# Patient Record
Sex: MALE | Race: WHITE | Employment: OTHER | URBAN - METROPOLITAN AREA
[De-identification: names, ages, dates, MRNs, and addresses within clinical notes are randomized per-mention and may not be internally consistent; named-entity substitution may affect disease eponyms.]

---

## 2017-02-20 ENCOUNTER — HOSPITAL ENCOUNTER (OUTPATIENT)
Dept: ENDOSCOPY | Age: 72
Discharge: HOME | End: 2017-02-20
Attending: INTERNAL MEDICINE | Admitting: INTERNAL MEDICINE

## 2017-02-20 VITALS
WEIGHT: 175 LBS | RESPIRATION RATE: 18 BRPM | DIASTOLIC BLOOD PRESSURE: 93 MMHG | BODY MASS INDEX: 25.92 KG/M2 | OXYGEN SATURATION: 97 % | HEART RATE: 70 BPM | SYSTOLIC BLOOD PRESSURE: 165 MMHG | HEIGHT: 69 IN | TEMPERATURE: 97.8 F

## 2017-02-20 RX ORDER — SODIUM CHLORIDE 9 MG/ML
INJECTION, SOLUTION INTRAVENOUS CONTINUOUS
Status: DISCONTINUED | OUTPATIENT
Start: 2017-02-20 | End: 2017-02-21 | Stop reason: HOSPADM

## 2017-02-20 RX ORDER — ELECTROLYTES/DEXTROSE
1 SOLUTION, ORAL ORAL DAILY
COMMUNITY

## 2017-02-20 RX ORDER — METOPROLOL TARTRATE 50 MG/1
50 TABLET, FILM COATED ORAL DAILY
Status: ON HOLD | COMMUNITY
End: 2019-04-10

## 2017-02-20 RX ADMIN — SODIUM CHLORIDE: 9 INJECTION, SOLUTION INTRAVENOUS at 08:34

## 2017-02-20 ASSESSMENT — PAIN SCALES - GENERAL
PAINLEVEL_OUTOF10: 0

## 2017-02-20 ASSESSMENT — PAIN - FUNCTIONAL ASSESSMENT: PAIN_FUNCTIONAL_ASSESSMENT: 0-10

## 2019-04-10 ENCOUNTER — HOSPITAL ENCOUNTER (INPATIENT)
Age: 74
LOS: 6 days | Discharge: HOME HEALTH | DRG: 091 | End: 2019-04-16
Attending: PHYSICAL MEDICINE & REHABILITATION | Admitting: PHYSICAL MEDICINE & REHABILITATION
Payer: MEDICARE

## 2019-04-10 PROBLEM — Z96.612 STATUS POST TOTAL SHOULDER ARTHROPLASTY, LEFT: Status: ACTIVE | Noted: 2019-04-10

## 2019-04-10 PROCEDURE — 1280000000 HC REHAB R&B

## 2019-04-10 PROCEDURE — 6360000002 HC RX W HCPCS: Performed by: PHYSICAL MEDICINE & REHABILITATION

## 2019-04-10 PROCEDURE — 94640 AIRWAY INHALATION TREATMENT: CPT

## 2019-04-10 PROCEDURE — 6370000000 HC RX 637 (ALT 250 FOR IP): Performed by: PHYSICAL MEDICINE & REHABILITATION

## 2019-04-10 PROCEDURE — 94760 N-INVAS EAR/PLS OXIMETRY 1: CPT

## 2019-04-10 RX ORDER — ACETAMINOPHEN 500 MG
1000 TABLET ORAL EVERY 6 HOURS PRN
COMMUNITY

## 2019-04-10 RX ORDER — ONDANSETRON 4 MG/1
4 TABLET, FILM COATED ORAL EVERY 8 HOURS PRN
Status: DISCONTINUED | OUTPATIENT
Start: 2019-04-10 | End: 2019-04-16 | Stop reason: HOSPADM

## 2019-04-10 RX ORDER — ALLOPURINOL 300 MG/1
300 TABLET ORAL DAILY
Status: DISCONTINUED | OUTPATIENT
Start: 2019-04-10 | End: 2019-04-10

## 2019-04-10 RX ORDER — ALBUTEROL SULFATE 90 UG/1
2 AEROSOL, METERED RESPIRATORY (INHALATION) EVERY 6 HOURS PRN
COMMUNITY

## 2019-04-10 RX ORDER — ALLOPURINOL 100 MG/1
200 TABLET ORAL DAILY
Status: DISCONTINUED | OUTPATIENT
Start: 2019-04-11 | End: 2019-04-16 | Stop reason: HOSPADM

## 2019-04-10 RX ORDER — HEPARIN SODIUM 5000 [USP'U]/ML
5000 INJECTION, SOLUTION INTRAVENOUS; SUBCUTANEOUS EVERY 8 HOURS SCHEDULED
Status: DISCONTINUED | OUTPATIENT
Start: 2019-04-10 | End: 2019-04-16 | Stop reason: HOSPADM

## 2019-04-10 RX ORDER — VANCOMYCIN HYDROCHLORIDE 125 MG/1
125 CAPSULE ORAL EVERY 6 HOURS SCHEDULED
Status: DISCONTINUED | OUTPATIENT
Start: 2019-04-10 | End: 2019-04-16 | Stop reason: HOSPADM

## 2019-04-10 RX ORDER — ACETAMINOPHEN 325 MG/1
650 TABLET ORAL EVERY 4 HOURS PRN
Status: DISCONTINUED | OUTPATIENT
Start: 2019-04-10 | End: 2019-04-16 | Stop reason: HOSPADM

## 2019-04-10 RX ORDER — THIAMINE MONONITRATE (VIT B1) 100 MG
100 TABLET ORAL DAILY
Status: DISCONTINUED | OUTPATIENT
Start: 2019-04-11 | End: 2019-04-16 | Stop reason: HOSPADM

## 2019-04-10 RX ORDER — FOLIC ACID 1 MG/1
1 TABLET ORAL DAILY
COMMUNITY
End: 2020-12-18

## 2019-04-10 RX ORDER — TRAZODONE HYDROCHLORIDE 50 MG/1
25 TABLET ORAL NIGHTLY
Status: ON HOLD | COMMUNITY
End: 2019-04-16 | Stop reason: SDUPTHER

## 2019-04-10 RX ORDER — ALBUTEROL SULFATE 2.5 MG/3ML
2.5 SOLUTION RESPIRATORY (INHALATION) EVERY 6 HOURS PRN
Status: DISCONTINUED | OUTPATIENT
Start: 2019-04-10 | End: 2019-04-16 | Stop reason: HOSPADM

## 2019-04-10 RX ORDER — SENNA AND DOCUSATE SODIUM 50; 8.6 MG/1; MG/1
2 TABLET, FILM COATED ORAL 2 TIMES DAILY
Status: DISCONTINUED | OUTPATIENT
Start: 2019-04-10 | End: 2019-04-12

## 2019-04-10 RX ORDER — FOLIC ACID 1 MG/1
1 TABLET ORAL DAILY
Status: DISCONTINUED | OUTPATIENT
Start: 2019-04-11 | End: 2019-04-16 | Stop reason: HOSPADM

## 2019-04-10 RX ORDER — VERAPAMIL HYDROCHLORIDE 240 MG/1
240 TABLET, FILM COATED, EXTENDED RELEASE ORAL DAILY
Status: DISCONTINUED | OUTPATIENT
Start: 2019-04-11 | End: 2019-04-16 | Stop reason: HOSPADM

## 2019-04-10 RX ORDER — ASPIRIN 81 MG/1
81 TABLET ORAL DAILY
Status: DISCONTINUED | OUTPATIENT
Start: 2019-04-11 | End: 2019-04-16 | Stop reason: HOSPADM

## 2019-04-10 RX ORDER — MULTIVITAMIN WITH FOLIC ACID 400 MCG
1 TABLET ORAL DAILY
Status: DISCONTINUED | OUTPATIENT
Start: 2019-04-11 | End: 2019-04-16 | Stop reason: HOSPADM

## 2019-04-10 RX ORDER — TRAZODONE HYDROCHLORIDE 50 MG/1
25 TABLET ORAL NIGHTLY
Status: DISCONTINUED | OUTPATIENT
Start: 2019-04-10 | End: 2019-04-16 | Stop reason: HOSPADM

## 2019-04-10 RX ORDER — THIAMINE MONONITRATE (VIT B1) 100 MG
100 TABLET ORAL DAILY
COMMUNITY

## 2019-04-10 RX ADMIN — TRAZODONE HYDROCHLORIDE 25 MG: 50 TABLET ORAL at 22:21

## 2019-04-10 RX ADMIN — Medication 2 PUFF: at 21:09

## 2019-04-10 RX ADMIN — HEPARIN SODIUM 5000 UNITS: 5000 INJECTION INTRAVENOUS; SUBCUTANEOUS at 22:21

## 2019-04-10 RX ADMIN — VANCOMYCIN HYDROCHLORIDE 125 MG: 125 CAPSULE ORAL at 22:21

## 2019-04-10 ASSESSMENT — PAIN SCALES - GENERAL: PAINLEVEL_OUTOF10: 0

## 2019-04-10 NOTE — PROGRESS NOTES
[address] RECORD NUMBER:  [id number]  AGE: 72 y.o.    GENDER: male  : 1945  TODAYS DATE:  4/10/2019      Room Orientation     Patient admitted to room 3275 per [] Wheel Chair  [] Bed  [] Recliner  [x] Stretcher  [] Other:      Transferred to:  [] Bed  [x] Recliner  [] Other  Patient Required minimum assistance with None    Patient was oriented to:  [x] Call Light  [x] Room Phone  [x] TV  [x] Thermostat  [x] Bathroom  [x] Bed and Chair Alarms per Rehab Policy  [x] Closet  [x] F[employer] Soup and it's Use on Rehab  [] Other:    Patient Verbalized Understanding: Yes  Family Present: No      Rehab Orientation     [x] 3 Hours of Therapy  through   [x] Focus and Specialty of Physical, Occupational, and Speech and Language Pathology  [x] Weekly Team Conference and Discharge Planning  [x] Roles of the Rehab Doctor, Consulting Doctors, Nursing, Dietary, and Social Work  [x] Everyday Clothing, including proper footwear, for Therapy  [x] Visiting Hours, Visitor Ages, and Visitors Sleeping Overnight in the Hospital  [x] Visitor Participation in Therapy  [x]  and Sundays on Rehab  [x] Introduction of Welcome Folder, including Rehab Expectations, Nurse Manager's Welcome Letter, Visitor's Guide, and Menu Service  [x] Planning Ahead and Ordering Meals  [x] Falls Contract [x] Signed, [x] Copied, and [x] Taped to [employer]  [x] Other: Patient Rights    Patient Verbalized Understanding: Yes  Family Present: No    Electronically signed by Jennifer Chaudhari RN on 4/10/2019 at 7:29 PM

## 2019-04-10 NOTE — PROGRESS NOTES
[facility]  Dr. Vale Art Progress Note  4/10/2019  11:58 AM    Patient Name:   Sara Gold  YOB: 1945    Diagnosis: Left shoulder joint prosthetic infection        Subjective: Patient is a 72-year-old male with a history of left shoulder arthroplasty in Dec. 2016 that developed an infection post op, and he had incision and drainage and IV antibiotics for 6 weeks, and on oral Cipro for 6 months. He did well until March 2018 when he developed left shoulder pain and swelling again, and was placed on oral antibiotic therapy with a repeat incision and drainage of a left shoulder abscess. He did well with oral antibiotics again until January, 2019 when he developed pain and swelling again while taking po Cipro. In March, he had removal of his left total shoulder arthroplasty prosthesis. He was placed on IV antibiotics, and he is now taking vancomycin and cefepime. He also developed acute kidney injury and was placed on hemodialysis. He was started in therapies, but needs more therapy before discharged to home safely with his family. He is now admitted to our rehabilitation work on goals of improving his transfers, gait, balance, and self-care activities with no weightbearing status of the left arm. He continues on IV antibiotics and hemodialysis. We'll get nephrology consult. There were no vitals taken for this visit. Last 24 hour lab  No results found for this or any previous visit (from the past 24 hour(s)). Plan: Will plan to admit to our Rehab Unit today.       Dr. Vale Art

## 2019-04-11 LAB
ANION GAP SERPL CALCULATED.3IONS-SCNC: 13 MMOL/L (ref 3–16)
BUN BLDV-MCNC: 51 MG/DL (ref 7–20)
CALCIUM SERPL-MCNC: 8.9 MG/DL (ref 8.3–10.6)
CHLORIDE BLD-SCNC: 99 MMOL/L (ref 99–110)
CO2: 25 MMOL/L (ref 21–32)
CREAT SERPL-MCNC: 6.3 MG/DL (ref 0.8–1.3)
GFR AFRICAN AMERICAN: 11
GFR NON-AFRICAN AMERICAN: 9
GLUCOSE BLD-MCNC: 97 MG/DL (ref 70–99)
HBV SURFACE AB TITR SER: <3.5 MIU/ML
HCT VFR BLD CALC: 25.7 % (ref 40.5–52.5)
HEMOGLOBIN: 8.3 G/DL (ref 13.5–17.5)
HEPATITIS B SURFACE ANTIGEN INTERPRETATION: NORMAL
MAGNESIUM: 1.8 MG/DL (ref 1.8–2.4)
MCH RBC QN AUTO: 32.4 PG (ref 26–34)
MCHC RBC AUTO-ENTMCNC: 32.4 G/DL (ref 31–36)
MCV RBC AUTO: 100.1 FL (ref 80–100)
PDW BLD-RTO: 16.3 % (ref 12.4–15.4)
PLATELET # BLD: 167 K/UL (ref 135–450)
PMV BLD AUTO: 9.4 FL (ref 5–10.5)
POTASSIUM REFLEX MAGNESIUM: 4.3 MMOL/L (ref 3.5–5.1)
RBC # BLD: 2.57 M/UL (ref 4.2–5.9)
SODIUM BLD-SCNC: 137 MMOL/L (ref 136–145)
WBC # BLD: 5.9 K/UL (ref 4–11)

## 2019-04-11 PROCEDURE — 1280000000 HC REHAB R&B

## 2019-04-11 PROCEDURE — 97535 SELF CARE MNGMENT TRAINING: CPT

## 2019-04-11 PROCEDURE — 90937 HEMODIALYSIS REPEATED EVAL: CPT

## 2019-04-11 PROCEDURE — 94640 AIRWAY INHALATION TREATMENT: CPT

## 2019-04-11 PROCEDURE — 97110 THERAPEUTIC EXERCISES: CPT | Performed by: PHYSICAL THERAPIST

## 2019-04-11 PROCEDURE — 2580000003 HC RX 258: Performed by: PHYSICAL MEDICINE & REHABILITATION

## 2019-04-11 PROCEDURE — 36415 COLL VENOUS BLD VENIPUNCTURE: CPT

## 2019-04-11 PROCEDURE — 97167 OT EVAL HIGH COMPLEX 60 MIN: CPT

## 2019-04-11 PROCEDURE — 6370000000 HC RX 637 (ALT 250 FOR IP): Performed by: PHYSICAL MEDICINE & REHABILITATION

## 2019-04-11 PROCEDURE — 83735 ASSAY OF MAGNESIUM: CPT

## 2019-04-11 PROCEDURE — 85027 COMPLETE CBC AUTOMATED: CPT

## 2019-04-11 PROCEDURE — 97530 THERAPEUTIC ACTIVITIES: CPT

## 2019-04-11 PROCEDURE — 80048 BASIC METABOLIC PNL TOTAL CA: CPT

## 2019-04-11 PROCEDURE — 6360000002 HC RX W HCPCS: Performed by: PHYSICAL MEDICINE & REHABILITATION

## 2019-04-11 PROCEDURE — 97530 THERAPEUTIC ACTIVITIES: CPT | Performed by: PHYSICAL THERAPIST

## 2019-04-11 PROCEDURE — 97162 PT EVAL MOD COMPLEX 30 MIN: CPT | Performed by: PHYSICAL THERAPIST

## 2019-04-11 PROCEDURE — 97116 GAIT TRAINING THERAPY: CPT | Performed by: PHYSICAL THERAPIST

## 2019-04-11 PROCEDURE — 86704 HEP B CORE ANTIBODY TOTAL: CPT

## 2019-04-11 PROCEDURE — 87340 HEPATITIS B SURFACE AG IA: CPT

## 2019-04-11 PROCEDURE — 5A1D70Z PERFORMANCE OF URINARY FILTRATION, INTERMITTENT, LESS THAN 6 HOURS PER DAY: ICD-10-PCS | Performed by: INTERNAL MEDICINE

## 2019-04-11 PROCEDURE — 86706 HEP B SURFACE ANTIBODY: CPT

## 2019-04-11 PROCEDURE — 94760 N-INVAS EAR/PLS OXIMETRY 1: CPT

## 2019-04-11 RX ADMIN — VANCOMYCIN HYDROCHLORIDE 125 MG: 125 CAPSULE ORAL at 13:13

## 2019-04-11 RX ADMIN — Medication 100 MG: at 08:00

## 2019-04-11 RX ADMIN — VANCOMYCIN HYDROCHLORIDE 125 MG: 125 CAPSULE ORAL at 05:18

## 2019-04-11 RX ADMIN — ALLOPURINOL 200 MG: 100 TABLET ORAL at 08:00

## 2019-04-11 RX ADMIN — CEFEPIME 2 G: 2 INJECTION, POWDER, FOR SOLUTION INTRAVENOUS at 21:52

## 2019-04-11 RX ADMIN — ASPIRIN 81 MG: 81 TABLET ORAL at 08:00

## 2019-04-11 RX ADMIN — HEPARIN SODIUM 5000 UNITS: 5000 INJECTION INTRAVENOUS; SUBCUTANEOUS at 21:51

## 2019-04-11 RX ADMIN — HEPARIN SODIUM 5000 UNITS: 5000 INJECTION INTRAVENOUS; SUBCUTANEOUS at 13:13

## 2019-04-11 RX ADMIN — MOMETASONE FUROATE AND FORMOTEROL FUMARATE DIHYDRATE 1 PUFF: 100; 5 AEROSOL RESPIRATORY (INHALATION) at 20:49

## 2019-04-11 RX ADMIN — HEPARIN SODIUM 5000 UNITS: 5000 INJECTION INTRAVENOUS; SUBCUTANEOUS at 05:18

## 2019-04-11 RX ADMIN — VERAPAMIL HYDROCHLORIDE 240 MG: 240 TABLET, FILM COATED, EXTENDED RELEASE ORAL at 08:00

## 2019-04-11 RX ADMIN — THERA TABS 1 TABLET: TAB at 08:00

## 2019-04-11 RX ADMIN — MOMETASONE FUROATE AND FORMOTEROL FUMARATE DIHYDRATE 1 PUFF: 100; 5 AEROSOL RESPIRATORY (INHALATION) at 10:23

## 2019-04-11 RX ADMIN — FOLIC ACID 1 MG: 1 TABLET ORAL at 08:00

## 2019-04-11 RX ADMIN — VANCOMYCIN HYDROCHLORIDE 125 MG: 125 CAPSULE ORAL at 00:30

## 2019-04-11 RX ADMIN — TRAZODONE HYDROCHLORIDE 25 MG: 50 TABLET ORAL at 21:51

## 2019-04-11 ASSESSMENT — PAIN SCALES - GENERAL
PAINLEVEL_OUTOF10: 0

## 2019-04-11 NOTE — PROGRESS NOTES
Physical Therapy    Facility/Department: [address] REHAB  Initial Assessment and Daily Treatment Note  - AM and PM Sessions        NAME: Harry Yoo  : 1945  MRN: [id number]    Date of Service: 2019    Discharge Recommendations:  Continue to assess pending progress, Home with assist PRN, Patient would benefit from continued therapy after discharge   PT Equipment Recommendations  Other: currently using no assistive device    Assessment   Body structures, Functions, Activity limitations: Decreased functional mobility ; Decreased strength;Decreased endurance  Assessment: Patient presents to inpatient rehab with diagnosis of metabolic encephalopathy with infection of left total shoulder replacement that now has a spacer in place. Patient is wearing a sling for support and protection of left shoulder joint and is aware of continued need to move elbow and wrist.  Patient has impaired sensation in bilateral forefeet and impaired vision/depth perception which limit safety with functional ambulation. However, patient was able to ambulate 76' with no device and CGA on level surfaces, curb and steps with 1 rail, with minimal to moderate shortness of breath. Patient would benefit from additional therapies on inpatient rehab to maximize safety, strength, endurance, and independence with funcitional mobility prior to retuning home with his wife who has dementia and intermittent assist from their children. Treatment Diagnosis: impaired functional mobility, unsteady gait  Prognosis: Good  Decision Making: Medium Complexity  History: Per Dr. Deepti Bai - Patient is a 21-year-old male with a history of left shoulder arthroplasty in Dec. 2016 that developed an infection post op, and he had incision and drainage and IV antibiotics for 6 weeks, and on oral Cipro for 6 months.  He did well until 2018 when he developed left shoulder pain and swelling again, and was placed on oral antibiotic therapy with a repeat transition to MWF - will be determined. TDC right chest, PICC line right upper arm,  sling for left UE when up and moving per patient report  Vision/Hearing  Vision: Impaired  Vision Exceptions: Wears glasses at all times(reports depth perception problems due to history of torn retina)  Hearing: Exceptions to [facility]  Hearing Exceptions: Hard of hearing/hearing concerns;Bilateral hearing aid     Subjective  General  Chart Reviewed: Yes  Additional Pertinent Hx: Per Dr. Zuri Souza - Patient is a 29-year-old male with a history of left shoulder arthroplasty in Dec. 2016 that developed an infection post op, and he had incision and drainage and IV antibiotics for 6 weeks, and on oral Cipro for 6 months. He did well until March 2018 when he developed left shoulder pain and swelling again, and was placed on oral antibiotic therapy with a repeat incision and drainage of a left shoulder abscess. He did well with oral antibiotics again until January, 2019 when he developed pain and swelling again while taking po Cipro. In March, he had removal of his left total shoulder arthroplasty prosthesis. He was placed on IV antibiotics, and he is now taking vancomycin and cefepime. He also developed acute kidney injury and was placed on hemodialysis. He was started in therapies, but needs more therapy before discharged to home safely with his family. He is now admitted to our rehabilitation work on goals of improving his transfers, gait, balance, and self-care activities with no weightbearing status of the left arm. He continues on IV antibiotics and hemodialysis. We'll get nephrology consult. Referring Practitioner: Sharad Gold MD  Referral Date : 04/10/19  Diagnosis: metabolic encephalopathy with Left shoulder joint prosthetic infection  Subjective  Subjective: Patient reports having dialysis later this afternoon - is aware that he has a therapy session and nurse informed him that he would go to dialysis after that appointment.   Denies pain.           Orientation  Orientation  Overall Orientation Status: Within Functional Limits  Social/Functional History  Social/Functional History  Lives With: Spouse(wife has Alheimer's, daughters assist with her care)  Type of Home: House(town house,)  Home Layout: Two level(first floor and basement, patient sleeps in basement due to sleep apnea 6 +7 steps down with railing on both sides)  Home Access: Stairs to enter with rails  Entrance Stairs - Number of Steps: 1 step into front without rails, 2 steps into garage without rails  Entrance Stairs - Rails: None  Bathroom Shower/Tub: Walk-in shower(upstairs - bathroom pt uses for shower)  Bathroom Toilet: Standard(comfort height)  Bathroom Equipment: Shower chair  Receives Help From: Family, Neighbor  ADL Assistance: Independent  Homemaking Assistance: Independent  Homemaking Responsibilities: Yes  Ambulation Assistance: Independent  Transfer Assistance: Independent  Active : Yes(not since March after removal of shoulder prosthetic)  Mode of Transportation: Car  Occupation: Retired  Type of occupation:  and taught school  Leisure & Hobbies: active with [employer].   Used to play a lot of golf  IADL Comments: wife does laundry and dishes as it gives her something to do, but needs some supervision  Additional Comments: wife is currently staying by her self at night while patient in the hospital.  They are both widowers, so have 2 families to assist with her care  Cognition    NT    Objective     Observation/Palpation  Observation: sling on left arm - reports needs to way sling when up and moving    AROM RLE (degrees)  RLE AROM: WFL  RLE General AROM: shoulder slightly limited but functional  AROM LLE (degrees)  LLE General AROM: shoulder NT - in sling, spacer after total shoulder replacement infection; elbow and wrist WFL  AROM RUE (degrees)  RUE AROM : WFL  AROM LUE (degrees)  LUE AROM : WFL  Strength RLE  Strength RLE: WFL  Comment: grossly 4/5  Strength LLE  Strength LLE: WFL  Comment: grossly 4/5  Strength RUE  Strength RUE: WFL  Comment: shoulder 3+/5, elbow 4/5  Strength LUE  Comment: shoulder NT, elbow and wrist at least 3/5  Tone RLE  RLE Tone: Normotonic  Tone LLE  LLE Tone: Normotonic  Motor Control  Gross Motor?: WFL  Sensation  Overall Sensation Status: Impaired  Light Touch: Partial deficits in the RLE;Partial deficits in the LLE(diminished to absent sensation in bilateral forefeet, reports beginning with numbness in tips of fingers)  Bed mobility  Bridging: Stand by assistance  Rolling to Left: Stand by assistance  Rolling to Right: Stand by assistance  Supine to Sit: Stand by assistance  Sit to Supine: Stand by assistance  Scooting: Stand by assistance  Comment: on flat mat with 2 pillows to patient's R while seated  Transfers  Sit to Stand: Contact guard assistance  Stand to sit: Contact guard assistance  Car Transfer: Contact guard assistance  Comment: cues for hand placement with car transfer, otherwise using no device and naturally reaches for arm rest of chair with R UE, L UE in sling  Ambulation  Ambulation?: Yes  WB Status: sling when up and moving  Ambulation 1  Surface: level tile  Device: No Device  Assistance: Contact guard assistance  Quality of Gait: quick pace, occasional cues to slow down and attend to R side as patient reports poor vision and depth perception, fairly steady  Distance: 75', shorter distances with multiple turns to access car and recliner in his room at the end of the session  Stairs/Curb  Stairs?: Yes  Stairs  # Steps : 4  Rails: Right ascending  Curbs: 6\"  Device: Hand Held Assist(for curb step for steadying assist)  Other Apparatus: (L UE sling in place throughout session)  Assistance: Contact guard assistance  Comment: turned sidways to descend 4 steps with CGA, cue for safely placing entire foot on steps with ascendings steps; preferred to have hand held assist for ascending/descending curb step especially with impaired balance due to L UE in sling     Balance  Sitting - Static: Good  Sitting - Dynamic: Good  Standing - Static: Good;-  Standing - Dynamic: Good;-  Comments: able to  pen from standing position using a reacher with CGA - unable to safely bend over to retrieve pen from floor in a standing position and owns a reacher, gait with no device and CGA      Second Session  Patient reports that he will have dialysis later today and this Saturday, but will transition to Monday, Wednesday, Friday dialysis starting next week. No complaints and agreeable to therapy. Sit to stand with CGA/SBA. Ambulated 125' with no device and CGA/SBA, slightly unsteady with L arm in sling, but no significant loss of balance, mild shortness of breath. Standing exercises in parallel bars for R UE support, as L UE is in sling: heel/toe raises, mini squats, marching, hip abd, hip ext, and knee flex x 15 reps each LE - seated rest breaks between every 2 exercises due to moderate shortness of breath and general fatigue. NuStep x 8 minutes - resistance 4, R arm only 8, seat 8 with average steps per minute ~66. Assisted patient back to his room via ambulation with no device - ambulated 100' with CGA. Patient made comfortable in his room with all needs met. Sit to supine with SBA. Alli Parker RN, from dialysis present in patient to begin dialysis. Plan   Plan  Times per week: 5-6  Times per day: Twice a day  Plan weeks: 1  Current Treatment Recommendations: Strengthening, Balance Training, Functional Mobility Training, Transfer Training, Endurance Training, Gait Training, Stair training, Home Exercise Program, Safety Education & Training, Patient/Caregiver Education & Training, Positioning  Safety Devices  Type of devices:  All fall risk precautions in place, Gait belt, Left in chair, Call light within reach, Chair alarm in place    Goals  Short term goals  Time Frame for Short term goals: Patient will in 1 week:

## 2019-04-11 NOTE — PLAN OF CARE
Problem: Falls - Risk of:  Goal: Will remain free from falls  Description  Will remain free from falls  Outcome: Ongoing  Note:   Fall protocol in place. Bed and chair alarms in place. Non skid footwear on at all times. Room clean and clutter free. Call light with in reach. Education on proper call light use and to call before ambulating. Problem: IP BOWEL ELIMINATION  Goal: LTG - patient will utilize adaptive techniques/equipment to complete bowel elimination  Outcome: Ongoing     Problem: SKIN INTEGRITY  Goal: LTG - Patient will be free from infection  Outcome: Ongoing  Note:   Skin is clean and dry and intact. No new skin issues noted at this time. Skin assessment completed every shift. Toileting every 2 hrs and as needed. Problem: IP MOBILITY  Goal: LTG - patient will be able to go up and down a curb/step with the appropriate device  Outcome: Ongoing     Problem: Pain:  Goal: Pain level will decrease  Description  Pain level will decrease  Outcome: Ongoing  Note:   Patient has no complaints of pain at this time. PRN pain medication to be administered per MD orders. Pain assessed on a 0-10 scale. Repositioning for comfort.

## 2019-04-11 NOTE — PROGRESS NOTES
Patient admitted for Left Shoulder Arthroplast  on  April,  10, 2019 . Patient alert and oriented x 4 . VSS. Patient's last bowel movement noted on April , 10 , 2019. Patient transfers x 1 with walker. Patient aware of room and surroundings. Morning assessment complete. Patient educated on use of call light. Medication administered this morning with no complications. Patient current pain level status: denies, Interventions made as necessary: none needed  . Shannon Fall Score: 80. Robert Scale: 20.  Bed/Chair alarm on  and call light within reach. Input and output being monitored along with daily weights. Patient has no further needs at this time. RN will continue to monitor.

## 2019-04-11 NOTE — PLAN OF CARE
Chidi Frias RN  Outcome: Completed  4/10/2019 2330 by Reyna eKlly RN  Outcome: Ongoing  Note:   Patient has no complaints of pain at this time. PRN pain medication to be administered per MD orders. Pain assessed on a 0-10 scale. Repositioning for comfort. Goal: Control of acute pain  Description  Control of acute pain   4/11/2019 1005 by Chidi Frias RN  Outcome: Ongoing  4/11/2019 1004 by Chidi Frias RN  Reactivated  4/11/2019 0958 by Chidi Frias RN  Outcome: Completed  Goal: Control of chronic pain  Description  Control of chronic pain   4/11/2019 1005 by Chidi Frias RN  Outcome: Ongoing  4/11/2019 1004 by Chidi Frias RN  Reactivated  4/11/2019 0958 by Chidi Frias RN  Outcome: Completed     Problem: Mobility - Impaired:  Goal: Mobility will improve  4/11/2019 1005 by Chidi Frias RN  Outcome: Ongoing  4/11/2019 1004 by Chidi Frias RN  Reactivated  4/11/2019 0958 by Chidi Frias RN  Outcome: Completed     Problem: Infection - Surgical Site:  Goal: Will show no infection signs and symptoms  4/11/2019 1005 by Chidi Frias RN  Outcome: Ongoing  4/11/2019 1004 by Chidi Frias RN  Reactivated  4/11/2019 0958 by Chidi Frias RN  Outcome: Completed     Problem: Pain - Acute:  Goal: Pain level will decrease  Description  Pain level will decrease   4/11/2019 1005 by Chidi Frias RN  Outcome: Ongoing  4/11/2019 1004 by Chidi Frias RN  Reactivated  4/11/2019 0958 by Chidi Frias RN  Outcome: Completed  4/10/2019 2330 by Reyna Kelly RN  Outcome: Ongoing  Note:   Patient has no complaints of pain at this time. PRN pain medication to be administered per MD orders. Pain assessed on a 0-10 scale. Repositioning for comfort.        Problem: IP BREATHING  Goal: STG - respiratory rate and effort will be within normal limits for the patient  4/11/2019 1005 by Chidi Frias RN  Outcome: Ongoing  4/11/2019 1004 by Chidi Frias RN  Reactivated  4/11/2019 0958 by Chidi Frias, RN  Outcome: Completed  Goal: STG - Patient performs or directs assisted coughing  4/11/2019 1005 by Maren Serrano RN  Outcome: Ongoing  4/11/2019 1005 by Maren Serrano RN  Reactivated  4/11/2019 0958 by Maren Serrano RN  Outcome: Completed     Problem: NUTRITION  Goal: Patient maintains weight  4/11/2019 1005 by Maren Serrano RN  Outcome: Ongoing  4/11/2019 1002 by Maren Serrano RN  Reactivated  4/11/2019 0958 by Maren Serrano RN  Outcome: Completed     Problem: SAFETY  Goal: STG - patient locks brakes on wheelchair  4/11/2019 1005 by Maren Serrano RN  Outcome: Ongoing  4/11/2019 1002 by Maren Serrano RN  Reactivated  4/11/2019 0958 by Maren Serrano RN  Outcome: Completed  Goal: STG - Patient uses call light consistently to request assistance with transfers  4/11/2019 1005 by Maren Serrano RN  Outcome: Ongoing  4/11/2019 1002 by Maren Serrano RN  Reactivated  4/11/2019 0958 by Maren Serrano RN  Outcome: Completed   Bed in lowest position, wheels locked, bed/chair exit alarm in place, call light within reach, and non skid footwear on. Walkway free of clutter. Pt alert and oriented and able to make needs known. Pt educated to use call light when needing to get up, and pt utilizes call light to make needs known. Will continue to monitor.      Problem: PAIN  Goal: LTG - Patient will manage pain with the appropriate technique/Intervention  4/11/2019 1005 by Maren Serrano RN  Outcome: Ongoing  4/11/2019 1002 by Maren Serrano RN  Reactivated  4/11/2019 0958 by Maren Serrano RN  Outcome: Completed  Goal: LTG - Patient will demonstrate intervention for managing pain  4/11/2019 1005 by Maren Serrano RN  Outcome: Ongoing  4/11/2019 1002 by Maren Serrano RN  Reactivated  4/11/2019 0958 by Maren Serrano RN  Outcome: Completed  Goal: STG - Patient will reduce or eliminate use of analgesics  4/11/2019 1005 by Maren Serrano RN  Outcome: Ongoing  4/11/2019 1002 by Maren Serrano RN  Reactivated  4/11/2019 0958 by Maggie Weber RN  Outcome: Completed  Goal: STG - pain is manageable through therapies  4/11/2019 1005 by Maggie Weber RN  Outcome: Ongoing  4/11/2019 1002 by Maggie Weber RN  Reactivated  4/11/2019 0958 by Maggie Weber RN  Outcome: Completed  Goal: STG - Patient will verbalize an acceptable level of pain  4/11/2019 1005 by Maggie Weber RN  Outcome: Ongoing  4/11/2019 1002 by Maggie Weber RN  Reactivated  4/11/2019 0958 by Maggie Weber RN  Outcome: Completed  Goal: STG - patients pain is managed to allow active participation in daily activities  4/11/2019 1005 by Maggie Weber RN  Outcome: Ongoing  4/11/2019 1002 by Maggie Weber RN  Reactivated  4/11/2019 0958 by Maggie Weber RN  Outcome: Completed  Goal: STG - Patient will increase activity level  4/11/2019 1005 by Maggie Weber RN  Outcome: Ongoing  4/11/2019 1002 by Maggie Weber RN  Reactivated  4/11/2019 0958 by Maggie Weber RN  Outcome: Completed  Goal: STG - patient verbalizes a reduction in pain level  4/11/2019 1005 by Maggie Weber RN  Outcome: Ongoing  4/11/2019 1002 by Maggie Weber RN  Reactivated  4/11/2019 0958 by Maggie Weber RN  Outcome: Completed   Pt assessed for pain. Pt denies any pain at this time. Will continue to monitor pt and assess for pain throughout rest of shift.

## 2019-04-11 NOTE — H&P
not have any smokeless tobacco history on file. He reports that he drinks alcohol. He reports that he does not use drugs. family history includes Heart Disease in his father. REVIEW OF SYSTEMS:   CONSTITUTIONAL: negative for fevers, chills, diaphoresis, activity change, appetite change, fatigue, night sweats and unexpected weight change. EYES: negative for blurred vision, eye discharge, visual disturbance and icterus. HEENT: negative for hearing loss, tinnitus, ear drainage, sinus pressure, nasal congestion, epistaxis and snoring. RESPIRATORY: Negative for hemoptysis, cough, sputum production. + Asthma  CARDIOVASCULAR: negative for chest pain, palpitations, exertional chest pressure/discomfort, edema, syncope. GASTROINTESTINAL: negative for nausea, vomiting, diarrhea, constipation, blood in stool and abdominal pain. GENITOURINARY: negative for frequency, dysuria, urinary incontinence, decreased urine volume, and hematuria. HEMATOLOGIC/LYMPHATIC: negative for easy bruising, bleeding and lymphadenopathy. ALLERGIC/IMMUNOLOGIC: negative for recurrent infections, angioedema, anaphylaxis and drug reactions. ENDOCRINE: negative for weight changes and diabetic symptoms including polyuria, polydipsia and polyphagia. MUSCULOSKELETAL: negative for pain, joint swelling, decreased range of motion and muscle weakness. + Gout  NEUROLOGICAL: negative for headaches, slurred speech, unilateral weakness. PSYCHIATRIC/BEHAVIORAL: negative for hallucinations, behavioral problems, confusion and agitation. All pertinent positives are noted in the HPI.     Physical Examination:  Vitals:   Patient Vitals for the past 24 hrs:   BP Temp Temp src Pulse Resp SpO2 Weight   04/11/19 0406 (!) 157/65 98.4 °F (36.9 °C) Oral 114 17 92 % 204 lb 9.4 oz (92.8 kg)   04/10/19 2110 -- -- -- -- 18 95 % --     Psych: Stable mood, normal judgement, normal affect   Const: No distress  Eyes: Conjunctiva noninjected, no icterus noted; therapies including but not limited to  physical, occupational, respiratory, and speech, nutritional services, wound care,   and prosthetics and orthotics. Given the patients complex condition  and risk of further medical complications, rehabilitation services cannot be  safely provided at a lower level of care such as a skilled nursing facility. I have compared the patients medical and functional status at the time of the  preadmission screening and the same on this date, and there are no significant changes. By signing this document, I acknowledge that I have personally performed a  full physical examination on this patient within 24 hours of admission to  this inpatient rehabilitation facility and have determined the patient to be  able to tolerate the above course of treatment at an intensive level for a  reasonable period of time. I will be completing a detailed individualized  Plan of Care for this patient by day four of the patients stay based upon the  Preadmission Screen, this Post-Admission Evaluation, and the therapy  evaluations. 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 Follow bowel movements. Enema or suppository if needed. Bladder: Check PVR x 3. [address] if PVR > 200ml or if any volume is > 500 ml. Pain: Tylenol is ordered prn.        Barbara Casarez MD, 4/11/2019, 7:13 AM

## 2019-04-11 NOTE — PLAN OF CARE
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 Follow bowel movements. Enema or suppository if needed.      Bladder: Check PVR x 3. [address] if PVR > 200ml or if any volume is > 500 ml.      Pain: Tylenol is ordered prn.              ADMIT DATE:4/10/2019    Patient Goals:   Goal is to return home. Admitting Impairments: Decreased functional mobility ; Decreased endurance;Decreased high-level IADLs;Decreased ADL status; Decreased balance  Barriers: primary caregiver for wife with Alzheimers, limited use of left arm, decreased endurance, decreased strength, medical co-morbidities   Participation: prior to admission patient was independent, lived with wife who has Alzheimers, and was active in his Sh[employer].   Goal is to return home at prior level of funciton and be able to assist wife as before     CARE PLAN     NURSING:  Jonna Failing while on this unit will:     [x] Be continent of bowel and bladder     [x] Have an adequate number of bowel movements  [] Urinate with no urinary retention >300ml in bladder  [] Complete bladder protocol with gutierrez removal  [x] Maintain O2 SATs at 92%  [x] Have pain managed while on ARU       [] Be pain free by discharge   [x] Have no skin breakdown while on ARU  [] Have improved skin integrity via wound measurements  [x] Have no signs/symptoms of infection at the wound site  [x] Be free from injury during hospitalization   [x] Complete education with patient/family with understanding demonstrated for:  [x] Adjustment   [x] Other: no weight bearing to left arm, PICC line care, Vascath care, dialysis needs, C. Diff education. Nursing interventions may include bowel/bladder training, education for medical assistive devices, medication education, O2 saturation management, energy conservation, stress management techniques, fall prevention, alarms protocol, seating and positioning, skin/wound care, pressure relief instruction,dressing changes,  infection protection, DVT prophylaxis, and/or assistance with in room safety with transfers to bed, toilet, wheelchair, shower as well as bathroom activities and hygiene. Patient/caregiver education for:   [x] Disease/sustained injury/management      [x] Medication Use   [x] Surgical intervention   [x] Safety   [x] Body mechanics and or joint protection   [x] Health maintenance         PHYSICAL THERAPY:  Goals:                  Short term goals  Time Frame for Short term goals: Patient will in 1 week:   Short term goal 1: bed mobility independently  Short term goal 2: transfers independently  Short term goal 3: ambulate 200' with no device indpendently on level surfaces  Short term goal 4: ascend/descend curb step with no device or rail and close SBA/CGA for steadying  Short term goal 5: ascend/descend 12 steps with 1 rails and supervision            Long term goals  Time Frame for Long term goals : STGs=LTGs  These goals were reviewed with this patient at the time of assessment and Isabel Martines is in agreement. Plan of Care: Pt to be seen 90  mins per day for 5-6 day/week 1 week.                    Current Treatment Recommendations: Strengthening, Balance Training, Functional Mobility Training, Transfer Training, Endurance Training, Gait Training, Stair training, Home Exercise Program, Safety Education & Training, Patient/Caregiver Education & Training, Positioning      OCCUPATIONAL THERAPY:  Goals:             Short term goals  Time Frame for Short term goals: 1 week pt will. Short term goal 1: bathe indep, set up to cover PICC and port  Short term goal 2: dress indep  Short term goal 3: toilet indep with grab bar - AD as needed  Short term goal 4: transfer indep without device, but UE support as needed  Short term goal 5: functional mobility indep, use of sling as AD :  Long term goals  Time Frame for Long term goals : same as stg : These goals were reviewed with this patient at the time of assessment and Isabel Martines is in agreement    Plan of Care:  Pt to be seen 90   mins per day for 5-6 day/week 1 week. Patient Education: role of OT, goals      SPEECH THERAPY: Goals will be left blank if speech is not following this patient. Goals:                                                          CASE MANAGEMENT:  Goals:   Assist patient/family with discharge planning, patient/family counseling,   and coordination with insurance during ARU stay. Admission Period/Goal FIM SCORES  FIM Admit/Goal Score   Eating/Swallowing 7/7   Grooming 5/7   Bathing 4/5   Upper Body Dressing 5/7   Lower Body Dressing 4/7   Toileting 4/6   Bladder Charli, Accidents = FIM 6/6   Bowel  Charli, Accidents = FIM 6/6   Bed/Chair Transfer 4/6   Toilet Transfer 4/6   Tub/Shower Transfer  4/6   Locomotion:  Ambulation (Walk) / Wheelchair:  W = walk , w/c = wheelchair  Distance:   1= 0-49 ft , 2=  ft, 3= 150+ ft Distance: 2   Level of Assist: 2   Mode: w   FIM: 2/6      Stairs 2/5   Comprehension 7/7   Expression 6/7   Social Interaction 7/7   Problem Solving 5/6   Memory 6/6        Isabel Martines will be seen a minimum of 3 hours of therapy per day, a minimum of 5 out of 7 days per week. [] In this rare instance due to the nature of this patient's medical involvement, this patient will be seen 15 hours per week (900 minutes within a 7 day period).     Treatments may include therapeutic exercises, gait training, neuromuscular re-ed, transfer training, community reintegration, bed mobility, self care, home mgmt, cognitive training, energy conservation, and patient/family education. In addition, dietician/nutritionist may monitor calorie count as well as intake and collaboratively work with SLP on dietary upgrades. Neuropsychology/Psychology may evaluate and provide necessary support. Medical issues being managed closely and that require 24 hour availability of a physician:   [] Swallowing Precautions  [] Bowel/Bladder Fx  [x] Weight bearing precautions   [x] Wound Care    [x] Pain Mgmt   [x] Infection Protection   [x] DVT Prophylaxis   [x] Fall Precautions  [x] Fluid/Electrolyte/Nutrition Balance   [] Voice Protection   [] Respiratory  [] Other:    Medical Prognosis: [] Good  [x] Fair    [] Guarded   Total expected IRF days 7  Anticipated discharge destination:    [] Home Independently   [x] Home Modified Independent  [] Home with supervision    []SNF     [] Other                                           Physician anticipated functional outcomes:  Improve gait, transfers, self care to modified independent to allow safe return home with wife    IPOC brief synthesis: Patient is a 77-year-old male with a history of left shoulder arthroplasty in Dec. 2016 that developed an infection post op, and he had incision and drainage and IV antibiotics for 6 weeks, and on oral Cipro for 6 months. He did well until March 2018 when he developed left shoulder pain and swelling again, and was placed on oral antibiotic therapy with a repeat incision and drainage of a left shoulder abscess. He did well with oral antibiotics again until January, 2019 when he developed pain and swelling again while taking po Cipro. In March, he had removal of his left total shoulder arthroplasty prosthesis. He was placed on IV antibiotics, and he is now taking vancomycin and cefepime. He also developed acute kidney injury and was placed on hemodialysis.  He was started in therapies, but needs more therapy before discharged to home safely with his family. He is now admitted to our rehabilitation work on goals of improving his transfers, gait, balance, and self-care activities with no weightbearing status of the left arm. He continues on IV antibiotics and hemodialysis. We'll get nephrology consult.             I have reviewed this initial plan of care and agree with its contents:    Title   Name    Date    Time    Physician: Dr. Madyson Keys, 4/11/19, 1 pm    Case Mgmt:  Jannette Holt, [place]     Case Management   [phone]    4/12/2019  12:59 PM      OT: Jaswinder Sofia, OTR/L  #[id number] 4/11/19 12:16 PM      PT: Electronically signed by Hugo Caballero, PT #[id number] on 4/11/2019 at 2:51 PM    RN: Ross Foley RN, CRRN 04/13/2019 11:39 am    ST:    : Deanna Reyes, ASCENCION  4/12/2019  2455    Other:

## 2019-04-11 NOTE — PROGRESS NOTES
antibiotics, and he is now taking vancomycin and cefepime. He also developed acute kidney injury and was placed on hemodialysis. He was started in therapies, but needs more therapy before discharged to home safely with his family. He is now admitted to our rehabilitation work on goals of improving his transfers, gait, balance, and self-care activities with no weightbearing status of the left arm. He continues on IV antibiotics and hemodialysis. We'll get nephrology consult. (copied per note Dr Celia Slade 4/10/19)  Exam: bathing, dressing, grooming, toileting, transfers, functional mobility, UE function, cognition  Assistance / Modification: bathes with CGA/SBA, dresses UB with min assist for sling, assist for shoes, CGA/SBA for transfers and mobiltiy with out device except sling, grab bars  Patient Education: role of OT, goals  REQUIRES OT FOLLOW UP: Yes  Activity Tolerance  Activity Tolerance: Patient Tolerated treatment well  Safety Devices  Safety Devices in place: Yes  Type of devices: Gait belt; Chair alarm in place;Call light within reach; Left in chair;Nurse notified; Patient at risk for falls           Patient Diagnosis(es): metabolic encephalopathy     has a past medical history of Asthma, Hypertension, and Sleep apnea. has a past surgical history that includes eye surgery; Appendectomy; shoulder surgery; and Colonoscopy (02/20/2017). Treatment Diagnosis: decreased ADL, balance, mobility, activity tolerance      Restrictions  Restrictions/Precautions  Restrictions/Precautions: Weight Bearing  Upper Extremity Weight Bearing Restrictions  Left Upper Extremity Weight Bearing: Non Weight Bearing  Other: shoulder prosthesis removed  Position Activity Restriction  Other position/activity restrictions: contact plus precautions for C diff;  HD THS this week, but plan to transition to MWF - will be determined.   TDC right chest, PICC line right upper arm,  sling for left UE when up and moving per patient report    Subjective   General  Chart Reviewed: Yes  Additional Pertinent Hx:  Patient is a 70-year-old male with a history of left shoulder arthroplasty in Dec. 2016 that developed an infection post op, and he had incision and drainage and IV antibiotics for 6 weeks, and on oral Cipro for 6 months. He did well until March 2018 when he developed left shoulder pain and swelling again, and was placed on oral antibiotic therapy with a repeat incision and drainage of a left shoulder abscess. He did well with oral antibiotics again until January, 2019 when he developed pain and swelling again while taking po Cipro. In March, he had removal of his left total shoulder arthroplasty prosthesis. He was placed on IV antibiotics, and he is now taking vancomycin and cefepime. He also developed acute kidney injury and was placed on hemodialysis. He was started in therapies, but needs more therapy before discharged to home safely with his family. He is now admitted to our rehabilitation work on goals of improving his transfers, gait, balance, and self-care activities with no weightbearing status of the left arm. He continues on IV antibiotics and hemodialysis. We'll get nephrology consult.  (copied per note Dr Fabien Gao 4/10/19)  Family / Caregiver Present: No  Referring Practitioner: [facility]  Diagnosis: metabolic encephalopathy  Subjective  Subjective: Pt met bedside, agreeable to OT, minimal pain in left shoulder  Oxygen Therapy  SpO2: 95 %  Pulse Oximeter Device Mode: Intermittent  Pulse Oximeter Device Location: Right;Finger  Social/Functional History  Social/Functional History  Lives With: Spouse(wife has Alheimer's, daughters assist with her care)  Type of Home: House(town house,)  Home Layout: Two level(first floor and basement, patient sleeps in basement due to sleep apnea 6 +7 steps down with railing on both sides)  Home Access: Stairs to enter with rails  Entrance Stairs - Number of Steps: 1 step into front without rails, 2 steps into garage without rails  Entrance Stairs - Rails: None  Bathroom Shower/Tub: Walk-in shower(upstairs - bathroom pt uses for shower)  Bathroom Toilet: Standard(comfort height)  Bathroom Equipment: Shower chair  Receives Help From: Family, Neighbor  ADL Assistance: Independent  Homemaking Assistance: Independent  Homemaking Responsibilities: Yes  Ambulation Assistance: Independent  Transfer Assistance: Independent  Active : Yes(not since March after removal of shoulder prosthetic)  Mode of Transportation: Car  Occupation: Retired  Type of occupation:  and taught school  Leisure & Hobbies: active with [employer]. Used to play a lot of golf  IADL Comments: wife does laundry and dishes as it gives her something to do, but needs some supervision  Additional Comments: wife is currently staying by her self at night while patient in the hospital.  They are both widowers, so have 2 families to assist with her care       Objective   Vision: Impaired  Vision Exceptions: Wears glasses at all times  Hearing: Exceptions to [facility]  Hearing Exceptions: Hard of hearing/hearing concerns;Bilateral hearing aid    Orientation  Overall Orientation Status: Within Functional Limits  Observation/Palpation  Observation: sling on left arm - reports needs to wear sling when up and moving  Functional Mobility  Functional - Mobility Device: No device  Activity: To/from bathroom  Assist Level: Contact guard assistance  Functional Mobility Comments: CGA/SBA amb wtihout device  Toilet Transfers  Toilet - Technique: Ambulating  Equipment Used: Raised toilet seat without rails  Toilet Transfer: Contact guard assistance  Shower Transfers  Shower - Transfer From: Other  Shower - Transfer Type: To and From  Shower - Transfer To: Shower seat with back  Shower Transfers: Contact Guard  ADL  Grooming: Setup(standing at sink to brush teeth, comb hair.   Face and hands in shower)  UE Bathing: Setup(able to use left arm enough to bathe right)  LE Bathing: Setup;Contact guard assistance;Stand by assistance(seated on shower chair for majority of shower, stands with grab bar to assist with balance , then uses right hand for buttocks. Crosses legs to reach feet)  UE Dressing: Setup;Stand by assistance(uses good technique to don shirt, assisted minimally with sling around back)  LE Dressing: Stand by assistance;Minimal assistance(able to thread pants over feet, stand with CGA/SBA to pull over hips using both hands. Socks by crossing legs, but assisted with shoes due to being tight)  Toileting: Stand by assistance;Contact guard assistance(Managed clothing with SBA/CGA, hygiene per self after BM)  Additional Comments: prior to shower, nursing changed port dressing due to OT notifying that it was not intact. OT covered Port and PICC line prior to shower. Pt stated he uses LUE minimally for ADL, but does maintain NWB  Tone RUE  RUE Tone: Normotonic  Tone LUE  LUE Tone: Normotonic  Coordination  Movements Are Fluid And Coordinated: Yes              Cognition  Overall Cognitive Status: WFL  Cognition Comment: pt states he feels memory is back to normal, good recall of biographical information        Sensation  Overall Sensation Status: WFL        LUE AROM (degrees)  LUE General AROM: shoulder NT due to precautions, elbow and distal WFL.   Supported in sling after ADL  RUE AROM (degrees)  RUE AROM : WFL  LUE Strength  LUE Strength Comment: NT due to precautions  RUE Strength  Gross RUE Strength: WFL                   Plan   Plan  Times per week: 5-6 days per week  Times per day: Twice a day  Plan weeks: 1 week   Current Treatment Recommendations: Functional Mobility Training, Patient/Caregiver Education & Training, Home Management Training, Endurance Training, Equipment Evaluation, Education, & procurement, Balance Training, Safety Education & Training, Self-Care / ADL                 Goals  Short term goals  Time Frame for Short term goals: 1 week pt will.  Short term goal 1: bathe indep, set up to cover PICC and port  Short term goal 2: dress indep  Short term goal 3: toilet indep with grab bar - AD as needed  Short term goal 4: transfer indep without device, but UE support as needed  Short term goal 5: functional mobility indep, use of sling as AD  Long term goals  Time Frame for Long term goals : same as stg  Patient Goals   Patient goals : \"I want to be able to feel safe walking around, taking care of myself\"       Therapy Time   Individual Concurrent Group Co-treatment   Time In 0830         Time Out 1000         Minutes 90         Timed Code Treatment Minutes: [address], OTR/L [id number]

## 2019-04-11 NOTE — CONSULTS
Nutrition Assessment (Low Risk)    Type and Reason for Visit: Initial    Nutrition Recommendations:   Continue diet free of therapeutic restrictions   Will monitor nutritional adequacy, nutrition-related labs, weights, BMs, and clinical progress     Nutrition Assessment:  Patient assessed for nutritional risk. Deemed to be at low risk at this time. Will continue to monitor for changes in status.       Malnutrition Assessment:  · Malnutrition Status: No malnutrition    Nutrition Risk Level   Risk Level: Low    Nutrition Diagnosis:   · Problem: No nutrition diagnosis at this time    Nutrition Intervention:  Food and/or Delivery: Continue current diet  Nutrition Education/Counseling/Coordination of Care:  Continued Inpatient Monitoring      Electronically signed by Italia Álvarez RD, LD on 4/11/19 at 12:48 PM    Contact Number: [phone]

## 2019-04-11 NOTE — PROGRESS NOTES
Nephrology Attending Progress Note      SUBJECTIVE:  We are following this patient for PETRA. The patient was admitted to [address] [place] [address] last week with worsening edema, altered mental status and PETRA. Serologic work up has been negative. Initiated on HD 4/3/19 and shows no signs of recovery of kidney function yet. Working with PT. Kesha SOB. Describes UO as mnimal    Scheduled Meds:   aspirin  81 mg Oral Daily    multivitamin  1 tablet Oral Daily    folic acid  1 mg Oral Daily    thiamine  100 mg Oral Daily    vancomycin  125 mg Oral 4 times per day    verapamil  240 mg Oral Daily    sennosides-docusate sodium  2 tablet Oral BID    heparin (porcine)  5,000 Units Subcutaneous 3 times per day    mometasone-formoterol  1 puff Inhalation BID    allopurinol  200 mg Oral Daily    cefepime  2 g Intravenous Once per day on Tue Thu Sat    traZODone  25 mg Oral Nightly     Continuous Infusions:  PRN Meds:.albuterol, acetaminophen, ondansetron    Physical Exam:    TEMPERATURE:  Current - Temp: 98.4 °F (36.9 °C); Max - Temp  Av.4 °F (36.9 °C)  Min: 98.4 °F (36.9 °C)  Max: 98.4 °F (36.9 °C)  RESPIRATIONS RANGE: Resp  Av.5  Min: 17  Max: 18  PULSE RANGE: Pulse  Av  Min: 94  Max: 114  BLOOD PRESSURE RANGE:  Systolic (55WNN), LIC:269 , Min:157 , UHF:516   ; Diastolic (83CRV), XMV:47, Min:65, Max:74    24HR INTAKE/OUTPUT:  No intake or output data in the 24 hours ending 19 1153    General appearance: alert, appears stated age and cooperative, morbidly obese  Head: Normocephalic, atraumatic  Eyes: PERRL, EOM's intact. Nose: Nares normal.  No drainage or sinus tenderness.   Neck: no JVD, supple, symmetrical and thyroid   Lungs: clear to auscultation bilaterally  Heart: regular rate and rhythm, no rub or gallop  Abdomen: soft, non-tender; bowel sounds normal; no organomegaly  Extremities: 2+ edema  Skin: Skin color, texture, turgor normal. No rashes or lesions  Neurologic: Grossly normal    Access Exam:  TDC     LAB DATA:    CBC:   Lab Results   Component Value Date    WBC 5.9 04/11/2019    RBC 2.57 04/11/2019    HGB 8.3 04/11/2019    HCT 25.7 04/11/2019    .1 04/11/2019    MCH 32.4 04/11/2019    MCHC 32.4 04/11/2019    RDW 16.3 04/11/2019     04/11/2019    MPV 9.4 04/11/2019     BMP:    Lab Results   Component Value Date     04/11/2019    K 4.3 04/11/2019    CL 99 04/11/2019    CO2 25 04/11/2019    BUN 51 04/11/2019    CREATININE 6.3 04/11/2019    CALCIUM 8.9 04/11/2019    GFRAA 11 04/11/2019    LABGLOM 9 04/11/2019    GLUCOSE 97 04/11/2019     Ionized Calcium:  No results found for: IONCA  Magnesium:    Lab Results   Component Value Date    MG 1.80 04/11/2019       IMPRESSION/RECOMMENDATIONS:      Active Problems:    Status post total shoulder arthroplasty, left  Resolved Problems:    * No resolved hospital problems. *    1. PETRA - Etiology likely ATN - Started HD 4/3 - no sign of recovery of kidney function. Continue HD TTS. Will need a biopsy at some point next week. That was postponed at [facility] due to Thrombocytopenia    2. Hematuria/Proteinuria - UPCR 1.2 - HIV/Hepatitis non reactive; SPEP/UPEP no monoclonal protein. Complement normal. AVELINA negative    3. HTN - continue current medications    4. 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 Failed one stage revision and chronic suppressive antibiotic therapy. S/P first of planned two stage revision 3/5/19.  On abx

## 2019-04-11 NOTE — PROGRESS NOTES
RN asked Dr Junior Choe regarding diet. Patient currently on General diet. MD instructed that Nephrology will address order and change as necessary. RN will follow.

## 2019-04-12 ENCOUNTER — APPOINTMENT (OUTPATIENT)
Dept: GENERAL RADIOLOGY | Age: 74
DRG: 091 | End: 2019-04-12
Attending: PHYSICAL MEDICINE & REHABILITATION
Payer: MEDICARE

## 2019-04-12 LAB
ALBUMIN SERPL-MCNC: 3.4 G/DL (ref 3.4–5)
ANION GAP SERPL CALCULATED.3IONS-SCNC: 12 MMOL/L (ref 3–16)
BUN BLDV-MCNC: 27 MG/DL (ref 7–20)
CALCIUM SERPL-MCNC: 8.4 MG/DL (ref 8.3–10.6)
CHLORIDE BLD-SCNC: 96 MMOL/L (ref 99–110)
CO2: 26 MMOL/L (ref 21–32)
CREAT SERPL-MCNC: 4 MG/DL (ref 0.8–1.3)
GFR AFRICAN AMERICAN: 18
GFR NON-AFRICAN AMERICAN: 15
GLUCOSE BLD-MCNC: 84 MG/DL (ref 70–99)
PHOSPHORUS: 4.1 MG/DL (ref 2.5–4.9)
POTASSIUM SERPL-SCNC: 4.4 MMOL/L (ref 3.5–5.1)
SODIUM BLD-SCNC: 134 MMOL/L (ref 136–145)

## 2019-04-12 PROCEDURE — 6370000000 HC RX 637 (ALT 250 FOR IP): Performed by: PHYSICAL MEDICINE & REHABILITATION

## 2019-04-12 PROCEDURE — 97110 THERAPEUTIC EXERCISES: CPT | Performed by: PHYSICAL THERAPIST

## 2019-04-12 PROCEDURE — 80069 RENAL FUNCTION PANEL: CPT

## 2019-04-12 PROCEDURE — 6360000002 HC RX W HCPCS: Performed by: PHYSICAL MEDICINE & REHABILITATION

## 2019-04-12 PROCEDURE — 97530 THERAPEUTIC ACTIVITIES: CPT | Performed by: PHYSICAL THERAPIST

## 2019-04-12 PROCEDURE — 97530 THERAPEUTIC ACTIVITIES: CPT

## 2019-04-12 PROCEDURE — 94760 N-INVAS EAR/PLS OXIMETRY 1: CPT

## 2019-04-12 PROCEDURE — 2580000003 HC RX 258: Performed by: PHYSICAL MEDICINE & REHABILITATION

## 2019-04-12 PROCEDURE — 36415 COLL VENOUS BLD VENIPUNCTURE: CPT

## 2019-04-12 PROCEDURE — 97110 THERAPEUTIC EXERCISES: CPT

## 2019-04-12 PROCEDURE — 97116 GAIT TRAINING THERAPY: CPT | Performed by: PHYSICAL THERAPIST

## 2019-04-12 PROCEDURE — 94640 AIRWAY INHALATION TREATMENT: CPT

## 2019-04-12 PROCEDURE — 71045 X-RAY EXAM CHEST 1 VIEW: CPT

## 2019-04-12 PROCEDURE — 1280000000 HC REHAB R&B

## 2019-04-12 RX ORDER — SODIUM CHLORIDE 0.9 % (FLUSH) 0.9 %
10 SYRINGE (ML) INJECTION 2 TIMES DAILY
Status: DISCONTINUED | OUTPATIENT
Start: 2019-04-12 | End: 2019-04-16 | Stop reason: HOSPADM

## 2019-04-12 RX ORDER — SENNA AND DOCUSATE SODIUM 50; 8.6 MG/1; MG/1
2 TABLET, FILM COATED ORAL 2 TIMES DAILY PRN
Status: DISCONTINUED | OUTPATIENT
Start: 2019-04-12 | End: 2019-04-16 | Stop reason: HOSPADM

## 2019-04-12 RX ADMIN — VANCOMYCIN HYDROCHLORIDE 125 MG: 125 CAPSULE ORAL at 00:41

## 2019-04-12 RX ADMIN — VANCOMYCIN HYDROCHLORIDE 125 MG: 125 CAPSULE ORAL at 23:10

## 2019-04-12 RX ADMIN — MOMETASONE FUROATE AND FORMOTEROL FUMARATE DIHYDRATE 1 PUFF: 100; 5 AEROSOL RESPIRATORY (INHALATION) at 19:14

## 2019-04-12 RX ADMIN — HEPARIN SODIUM 5000 UNITS: 5000 INJECTION INTRAVENOUS; SUBCUTANEOUS at 21:18

## 2019-04-12 RX ADMIN — ASPIRIN 81 MG: 81 TABLET ORAL at 08:00

## 2019-04-12 RX ADMIN — Medication 100 MG: at 08:00

## 2019-04-12 RX ADMIN — TRAZODONE HYDROCHLORIDE 25 MG: 50 TABLET ORAL at 21:18

## 2019-04-12 RX ADMIN — VANCOMYCIN HYDROCHLORIDE 125 MG: 125 CAPSULE ORAL at 05:25

## 2019-04-12 RX ADMIN — FOLIC ACID 1 MG: 1 TABLET ORAL at 08:00

## 2019-04-12 RX ADMIN — HEPARIN SODIUM 5000 UNITS: 5000 INJECTION INTRAVENOUS; SUBCUTANEOUS at 13:31

## 2019-04-12 RX ADMIN — VERAPAMIL HYDROCHLORIDE 240 MG: 240 TABLET, FILM COATED, EXTENDED RELEASE ORAL at 08:00

## 2019-04-12 RX ADMIN — VANCOMYCIN HYDROCHLORIDE 125 MG: 125 CAPSULE ORAL at 13:31

## 2019-04-12 RX ADMIN — THERA TABS 1 TABLET: TAB at 08:00

## 2019-04-12 RX ADMIN — HEPARIN SODIUM 5000 UNITS: 5000 INJECTION INTRAVENOUS; SUBCUTANEOUS at 05:25

## 2019-04-12 RX ADMIN — ALLOPURINOL 200 MG: 100 TABLET ORAL at 08:00

## 2019-04-12 RX ADMIN — Medication 10 ML: at 21:18

## 2019-04-12 RX ADMIN — MOMETASONE FUROATE AND FORMOTEROL FUMARATE DIHYDRATE 1 PUFF: 100; 5 AEROSOL RESPIRATORY (INHALATION) at 11:14

## 2019-04-12 RX ADMIN — VANCOMYCIN HYDROCHLORIDE 125 MG: 125 CAPSULE ORAL at 17:43

## 2019-04-12 ASSESSMENT — PAIN SCALES - GENERAL
PAINLEVEL_OUTOF10: 0
PAINLEVEL_OUTOF10: 0

## 2019-04-12 NOTE — PROGRESS NOTES
organomegaly  Extremities: 1/2+ edema  Skin: Skin color, texture, turgor normal. No rashes or lesions  Neurologic: Grossly normal    Access Exam:  TDC     LAB DATA:    CBC:   Lab Results   Component Value Date    WBC 5.9 04/11/2019    RBC 2.57 04/11/2019    HGB 8.3 04/11/2019    HCT 25.7 04/11/2019    .1 04/11/2019    MCH 32.4 04/11/2019    MCHC 32.4 04/11/2019    RDW 16.3 04/11/2019     04/11/2019    MPV 9.4 04/11/2019     BMP:    Lab Results   Component Value Date     04/12/2019    K 4.4 04/12/2019    K 4.3 04/11/2019    CL 96 04/12/2019    CO2 26 04/12/2019    BUN 27 04/12/2019    CREATININE 4.0 04/12/2019    CALCIUM 8.4 04/12/2019    GFRAA 18 04/12/2019    LABGLOM 15 04/12/2019    GLUCOSE 84 04/12/2019     Ionized Calcium:  No results found for: IONCA  Magnesium:    Lab Results   Component Value Date    MG 1.80 04/11/2019       IMPRESSION/RECOMMENDATIONS:      Active Problems:    Status post total shoulder arthroplasty, left  Resolved Problems:    * No resolved hospital problems. *    1. PETRA - Etiology likely ATN - Started HD 4/3 - no sign of recovery of kidney function. Continue HD TTS. Will need a biopsy at some point next week. That was postponed at [facility] due to Thrombocytopenia. Switch to [facility] next week since outpatient [address] MWF/11:45 AM. New TW 90.5 KG    2. Hematuria/Proteinuria - UPCR 1.2 - HIV/Hepatitis non reactive; SPEP/UPEP no monoclonal protein. Complement normal. AVELINA negative    3. HTN - continue current medications    4. 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 Failed one stage revision and chronic suppressive antibiotic therapy. S/P first of planned two stage revision 3/5/19.  On Cefepime until 4/16/19 per ID at [facility]

## 2019-04-12 NOTE — PROGRESS NOTES
Physical Therapy  Facility/Department: [address] REHAB  Daily Treatment Note- AM and PM  NAME: Robbin Lawson  : 1945  MRN: [id number]    Date of Service: 2019    Discharge Recommendations:  Continue to assess pending progress, Home with assist PRN, Patient would benefit from continued therapy after discharge   PT Equipment Recommendations  Other: currently using no assistive device    Patient Diagnosis(es): There were no encounter diagnoses. has a past medical history of Asthma, Hypertension, and Sleep apnea. has a past surgical history that includes eye surgery; Appendectomy; shoulder surgery; and Colonoscopy (2017). Restrictions  Restrictions/Precautions  Restrictions/Precautions: Weight Bearing  Upper Extremity Weight Bearing Restrictions  Left Upper Extremity Weight Bearing: Non Weight Bearing  Other: shoulder prosthesis removed  Position Activity Restriction  Other position/activity restrictions: contact plus precautions for C diff;  HD THS this week, but plan to transition to MWF - will be determined. TDC right chest, PICC line right upper arm,  sling for left UE when up and moving per patient report  Subjective   General  Chart Reviewed: Yes  Additional Pertinent Hx: Per Dr. Joe Henry - Patient is a 72-year-old male with a history of left shoulder arthroplasty in Dec. 2016 that developed an infection post op, and he had incision and drainage and IV antibiotics for 6 weeks, and on oral Cipro for 6 months. He did well until 2018 when he developed left shoulder pain and swelling again, and was placed on oral antibiotic therapy with a repeat incision and drainage of a left shoulder abscess. He did well with oral antibiotics again until  when he developed pain and swelling again while taking po Cipro. In March, he had removal of his left total shoulder arthroplasty prosthesis. He was placed on IV antibiotics, and he is now taking vancomycin and cefepime.  He also developed acute kidney injury and was placed on hemodialysis. He was started in therapies, but needs more therapy before discharged to home safely with his family. He is now admitted to our rehabilitation work on goals of improving his transfers, gait, balance, and self-care activities with no weightbearing status of the left arm. He continues on IV antibiotics and hemodialysis. We'll get nephrology consult. Family / Caregiver Present: No  Referring Practitioner: Delio Romero MD  Subjective  Subjective: Patient reports doing well, no pain, tingling in feet.           Orientation  Orientation  Overall Orientation Status: Within Functional Limits  Cognition      Objective   Bed mobility  Bridging: Supervision  Rolling to Left: Supervision  Rolling to Right: Supervision  Supine to Sit: Supervision  Sit to Supine: Supervision  Scooting: Supervision  Transfers  Sit to Stand: Stand by assistance;Contact guard assistance  Stand to sit: Stand by assistance;Contact guard assistance  Ambulation  Ambulation?: Yes  WB Status: sling when up and moving  Ambulation 1  Surface: level tile  Device: No Device  Assistance: Contact guard assistance;Stand by assistance  Quality of Gait: quick pace, occasional cues to slow down and attend to R side as patient reports poor vision and depth perception, fairly steady  Distance: 175'  Stairs/Curb  Stairs?: Yes  Stairs  # Steps : 12  Rails: Right ascending  Curbs: 6\"  Device: Hand Held Assist(for curb step for steadying assist)  Other Apparatus: (L UE sling in place throughout session)  Assistance: Contact guard assistance  Comment: turned sidways to descend 12 steps with CGA with one rest break, cue for safely placing entire foot on steps with ascendings steps; preferred to have hand held assist for ascending/descending curb step especially with impaired balance due to L UE in sling        Exercises  Heelslides: 15 B LE  Hip Flexion: 20 B LE  Knee Long Arc Quad: 15 B LE  Ankle Pumps: 20 Second session-  S/ Patient reports doing well with no significant pain. O/ Sit to stand with SBA  Ambulate with no device with SBA/' x 2 , patient needs cues to slow down. Performed standing exercises- marches, heel rocks/toe raises x 10, knee flexion B LE x 10, rest, Hip abduction and extension x 10 , mini squats x 10 - during rest break PICC line nurse came to clear PICC line. Performed nustep x 5 min with WL 5 with B LE and R UE  Patient to OT session after PT. Assessment   Body structures, Functions, Activity limitations: Decreased functional mobility ; Decreased strength;Decreased endurance  Assessment: Patient presents to inpatient rehab with diagnosis of metabolic encephalopathy with infection of left total shoulder replacement that now has a spacer in place. Patient is wearing a sling for support and protection of left shoulder joint and is aware of continued need to move elbow and wrist.  Patient has impaired sensation in bilateral forefeet and impaired vision/depth perception which limit safety with functional ambulation. Patient was able to ambulate 175' with no device and SBA/CGA on level surfaces, curb and 12 steps with 1 rail with 1 rest break, with minimal to moderate shortness of breath, , O2 sats 95%. Patient would benefit from additional therapies on inpatient rehab to maximize safety, strength, endurance, and independence with funcitional mobility prior to retuning home with his wife who has dementia and intermittent assist from their children. Anticipate D/C early to mid next week.   Treatment Diagnosis: impaired functional mobility, unsteady gait  Prognosis: Good  Patient Education: goals and expectations on inpatient rehab, safety with functional mobility  REQUIRES PT FOLLOW UP: Yes  Activity Tolerance  Activity Tolerance: Patient Tolerated treatment well       Goals  Short term goals  Time Frame for Short term goals: Patient will in 1 week:   Short term goal 1: bed mobility independently  Short term goal 2: transfers independently  Short term goal 3: ambulate 200' with no device indpendently on level surfaces  Short term goal 4: ascend/descend curb step with no device or rail and close SBA/CGA for steadying  Short term goal 5: ascend/descend 12 steps with 1 rails and supervision  Long term goals  Time Frame for Long term goals : STGs=LTGs  Patient Goals   Patient goals : return home with his wife who has dementia and be able to cook and clean for her as previously, with intermittent help from their children; strengthen legs so can walk and move better    Plan    Plan  Times per week: 5-6  Times per day: Twice a day  Plan weeks: 1  Current Treatment Recommendations: Strengthening, Balance Training, Functional Mobility Training, Transfer Training, Endurance Training, Gait Training, Stair training, Home Exercise Program, Safety Education & Training, Patient/Caregiver Education & Training, Positioning  Safety Devices  Type of devices:  All fall risk precautions in place, Gait belt, Left in chair, Call light within reach, Chair alarm in place  Restraints  Initially in place: No     Therapy Time   Individual Concurrent Group Co-treatment   Time In 0815         Time Out 0900         Minutes 45         Timed Code Treatment Minutes: [address] [facility] Time     Individual Co-treatment   Time In [address] [place], [place] [address]yany, PT # [id number]

## 2019-04-12 NOTE — PROGRESS NOTES
RN called Dr Macie Oscar regarding diet. Patient on general diet and MD verified this diet was okay to continue. RN will continue.

## 2019-04-12 NOTE — PROGRESS NOTES
Patient admitted for Left Shoulder Arthroplast  on  April,  10, 2019 . Patient alert and oriented x 4 . VSS. Patient's last bowel movement noted on April 11 , 2019. Patient transfers x 1 with walker. Patient aware of room and surroundings. Morning assessment complete. Patient educated on use of call light. Medication administered this morning with no complications. Patient current pain level status: denies, Interventions made as necessary: none needed  . Shannon Fall Score: 80. Robert Scale: 20.  Bed/Chair alarm on  and call light within reach. Input and output being monitored along with daily weights. Patient has no further needs at this time. RN will continue to monitor.

## 2019-04-12 NOTE — PROGRESS NOTES
Occupational Therapy  Facility/Department: [address] IP REHAB  Daily Treatment Note  NAME: Harry Yoo  : 1945  MRN: [id number]    Date of Service: 2019    Discharge Recommendations:  Home with assist PRN         Patient Diagnosis(es): There were no encounter diagnoses. has a past medical history of Asthma, Hypertension, and Sleep apnea. has a past surgical history that includes eye surgery; Appendectomy; shoulder surgery; and Colonoscopy (2017). Restrictions  Restrictions/Precautions  Restrictions/Precautions: Weight Bearing  Upper Extremity Weight Bearing Restrictions  Left Upper Extremity Weight Bearing: Non Weight Bearing  Other: shoulder prosthesis removed  Position Activity Restriction  Other position/activity restrictions: contact plus precautions for C diff;  HD THS this week, but plan to transition to MWF - will be determined. TDC right chest, PICC line right upper arm,  sling for left UE when up and moving per patient report  Subjective   General  Chart Reviewed: Yes  Additional Pertinent Hx:  Patient is a 81-year-old male with a history of left shoulder arthroplasty in Dec. 2016 that developed an infection post op, and he had incision and drainage and IV antibiotics for 6 weeks, and on oral Cipro for 6 months. He did well until 2018 when he developed left shoulder pain and swelling again, and was placed on oral antibiotic therapy with a repeat incision and drainage of a left shoulder abscess. He did well with oral antibiotics again until  when he developed pain and swelling again while taking po Cipro. In March, he had removal of his left total shoulder arthroplasty prosthesis. He was placed on IV antibiotics, and he is now taking vancomycin and cefepime. He also developed acute kidney injury and was placed on hemodialysis. He was started in therapies, but needs more therapy before discharged to home safely with his family.  He is now admitted to our rehabilitation work on goals of improving his transfers, gait, balance, and self-care activities with no weightbearing status of the left arm. He continues on IV antibiotics and hemodialysis. We'll get nephrology consult. (copied per note Dr Zuri Souza 4/10/19)  Response to previous treatment: Patient with no complaints from previous session  Family / Caregiver Present: No  Referring Practitioner: [facility]  Diagnosis: metabolic encephalopathy  Subjective  Subjective: Patient seen in therapy department, contact precautions maintained      Orientation  Orientation  Overall Orientation Status: Within Functional Limits  Objective             Balance  Sitting Balance: Supervision  Standing Balance: Stand by assistance  Standing Balance  Activity: Static standing without device   Sit to stand: Stand by assistance  Stand to sit: Stand by assistance  Functional Mobility  Functional - Mobility Device: No device  Activity: Retrieve items;Transport items; To/from bathroom  Assist Level: Stand by assistance  Functional Mobility Comments: Functional mobility with SBA without LOB noted. Patient able to gather plate, frozen veggies and simulate microwave veggies and take to table with SBA. Toilet Transfers  Toilet - Technique: Ambulating  Equipment Used: Grab bars(comfort height commode and safety frames)  Toilet Transfer: Stand by assistance  Shower Transfers  Shower - Transfer From: Other  Shower - Transfer Type: To and From  Shower - Transfer To:  Shower seat with back  Shower - Technique: Ambulating  Shower Transfers: Stand by assistance  Shower Transfers Comments: Simulated walk in shower to shower chair stepping over 1 inch piece of wood with SBA  Bed mobility  Supine to Sit: Independent  Sit to Supine: Independent  Comment: Flat, regualr bed  Transfers  Sit to stand: Stand by assistance  Stand to sit: Stand by assistance  Transfer Comments: Transfer to recliner chair and chair with arms with SBA     Cognition  Overall Cognitive Status: WFL     Type of ROM/Therapeutic Exercise  Type of ROM/Therapeutic Exercise: CONG  Comment: AROM of both UE's in sitting 10 reps   Exercises  Elbow Flexion: 10  Elbow Extension: 10  Supination: 10  Pronation: 10  Wrist Flexion: 10  Wrist Extension: 10  Grasp/Release: 10         Assessment   Performance deficits / Impairments: Decreased functional mobility ; Decreased endurance;Decreased high-level IADLs;Decreased ADL status; Decreased balance  Assessment: Tolerated session well this am. Completes functional mobilty and transfers with SBA without LOB. Continue with POC. Patient Education: role of OT, safety  REQUIRES OT FOLLOW UP: Yes  Activity Tolerance  Activity Tolerance: Patient Tolerated treatment well  Safety Devices  Safety Devices in place: Yes(patient with transport)      Plan   Plan  Times per week: 5-6 days per week  Times per day: Twice a day  Plan weeks: 1 week   Current Treatment Recommendations: Functional Mobility Training, Patient/Caregiver Education & Training, Home Management Training, Endurance Training, Equipment Evaluation, Education, & procurement, Balance Training, Safety Education & Training, Self-Care / ADL    Goals  Short term goals  Time Frame for Short term goals: 1 week pt will.   Short term goal 1: bathe indep, set up to cover PICC and port  Short term goal 2: dress indep  Short term goal 3: toilet indep with grab bar - AD as needed  Short term goal 4: transfer indep without device, but UE support as needed  Short term goal 5: functional mobility indep, use of sling as AD  Long term goals  Time Frame for Long term goals : same as stg  Patient Goals   Patient goals : \"I want to be able to feel safe walking around, taking care of myself\"       Therapy Time   Individual Concurrent Group Co-treatment   Time In 0900         Time Out 0945         Minutes 45                 Electronically signed by Dg Miner [id number] on 4/12/2019 at 9:46 AM

## 2019-04-12 NOTE — PROGRESS NOTES
Patient admitted to rehab s/p left shoulder arthroplasty. Patient is AAO x 4, pleasant and cooperative with care. Assessment completed. Abdomen is grossly rotund, patient states that this is his norm for the past several years. Remains in contact isolation for recent positive result of C-diff. Patient however was continent of 2 formed stools this evening, moderate amt, and both were brown. Patient toilets self independently with staff supervision. Hemodialysis treatment ended at 2024 this evening with minimal difficulties. Patient stated he did have an episode of bilateral leg cramps that were resolved with fluid bolus from HD RN. No complaints of pain or discomfort verbalized. Call light and belongings remain in reach. Will continue to monitor and assist as needed.

## 2019-04-12 NOTE — PATIENT CARE CONFERENCE
[facility]  Inpatient Rehabilitation  Weekly Team Conference Note      Date: 4/15/2019  Patient Name:  Fabian Asher    MRN: [id number]  : 1945  Gender: male  Physician: Dr Jorge Matute   Diagnosis: Status post total shoulder arthroplasty, left [Z96.612]  Status post total shoulder arthroplasty, left [Z96.612]    CASE MANAGEMENT  Assessment:   Goal is home.       PHYSICAL THERAPY    Bed mobility  Bridging: Supervision  Rolling to Left: Supervision  Rolling to Right: Supervision  Supine to Sit: Independent  Sit to Supine: Independent  Scooting: Supervision  Comment: Flat, regualr bed    Transfers:  Sit to Stand: Stand by assistance  Stand to sit: Stand by assistance  Comment: cues for hand placement with car transfer, otherwise using no device and naturally reaches for arm rest of chair with R UE, L UE in sling    WB Status: sling when up and moving  Ambulation 1  Surface: level tile, carpet  Device: No Device  Assistance: Stand by assistance  Quality of Gait: quick pace, occasional cues to slow down and attend to R side as patient reports poor vision and depth perception, fairly steady  Distance: 300', shorter distances with multiple turns to access curb and steps, 100' back to room at end of session and then shorter distances in room while managing cleaning of CPAP    Stairs  # Steps : 12  Rails: Right ascending  Curbs: 6\"  Device: No Device  Other Apparatus: (L UE sling in place throughout session)  Assistance: Contact guard assistance, Stand by assistance  Comment: turned sidways to descend 12 steps with close SBA, cue for safely placing entire foot on steps with ascendings steps; able to ascend/descend curb step without UE support and without hesitation despite limited balance due to L UE in sling - O2 sats to 91% after flight of steps, but returned to 98% with just couple minutes of seated rest.    Car Transfer: Contact guard assistance      FIMS:  Bed, Chair, Wheel Chair: 5 - Requires setup/supervision/cues  Walk: 4 - Contact Guard/Minimal Assistance Requires up to Contact Guard or Minimal Assistance to walk at least 150 feet  Distance Walked: 300'  Stairs: 4- Minimal Contact Assistance Perfoms 75% or more of the effort to go up and down one flight of stairs    PT Equipment Recommendations  Other: currently using no assistive device    Assessment: Patient presents to inpatient rehab with diagnosis of metabolic encephalopathy with infection of left total shoulder replacement that now has a spacer in place. Patient is wearing a sling for support and protection of left shoulder joint and is aware of continued need to move elbow and wrist.  Patient has impaired sensation in bilateral forefeet and impaired vision/depth perception which limit safety with functional ambulation. Patient was able to ambulate 175' with no device and SBA/CGA on level surfaces, curb and 12 steps with 1 rail with 1 rest break, with minimal to moderate shortness of breath, , O2 sats 95%. Patient would benefit from additional therapies on inpatient rehab to maximize safety, strength, endurance, and independence with funcitional mobility prior to retuning home with his wife who has dementia and intermittent assist from their children. Anticipate D/C early to mid week.        SPEECH THERAPY (intentionally left blank if not actively being seen by this service):      OCCUPATIONAL THERAPY    ADL  Grooming: Setup, Stand by assistance(standing at sink to shave, stated he brushed teeth this AM)  UE Bathing: Setup  LE Bathing: Stand by assistance(seated on shower chair for majority of shower, stood with light touch to grab bar with left UE - reminded pt not to use left arm for support)  UE Dressing: Setup, Stand by assistance(shirt per self, min assist for sling)  LE Dressing: Stand by assistance, Verbal cueing, Setup(donned pants/underwear, socks per self after set up, one cue for underwear inside out)  Toileting: Stand by assistance, Contact guard assistance(Managed clothing with SBA/CGA, hygiene per self after BM)  Additional Comments: covered TDC and PICC line with waterproof dressing prioir to shower    Bed mobility  Bridging: Supervision  Rolling to Left: Supervision  Rolling to Right: Supervision  Supine to Sit: Independent  Sit to Supine: Independent  Scooting: Supervision  Comment: Flat, regualr bed    Functional Transfers: Toilet Transfers  Toilet - Technique: Ambulating  Equipment Used: Grab bars(comfort height commode and safety frames)  Toilet Transfer: Stand by assistance     Shower Transfers  Shower - Transfer From: Other  Shower - Transfer Type: To and From  Shower - Transfer To: Shower seat with back  Shower - Technique: Ambulating  Shower Transfers: Stand by assistance  Shower Transfers Comments: Simulated walk in shower to shower chair stepping over 1 inch piece of wood with SBA              UE Function:  Right UE min decreased shoulder strength, history of shoulder replacement. LUE in sling, uses elbow and below lightly for ADL tasks      FIMS:  Eatin - Patient feeds self  Groomin - Requires setup/cues to do all tasks  Bathin - Able to bathe all 10 areas with setup/sup/cues  Dressing-Upper: 5 - Requires setup/supervision/cues and/or requires assist with presthesis/brace only  Dressing-Lower: 5 - Requires setup/supervision/cues and/or staff applies TEDS/prosthesis/brace only  Toiletin - Requires setup/supervision/cues(supervision only)  Toilet Transfer: 5 - Requires setup/supervision/cues  Shower Transfer: 5 - Supervision, set-up, cues         Assessment: Tolerated session well this am. Completes functional mobilty and transfers with SBA without LOB. Patient would benefit from additional therapies on inpatient rehab to maximize safety, strength, endurance, and independence with funcitional mobility prior to retuning home with his wife who has dementia and intermittent assist from their children. days       Interdisciplinary Individualized Plan of Care Review:    · Continue Current Plan of Care: Yes and No    · Modifications:_____transition home with home care ________________________    Special Needs in the Upcoming Week :    [] Family/Caregiver Education  [] Home visit  []Therapeutic Pass   [] Consults:_______    [] Other;_______    Patient Rehab Team Goals for the Upcoming Week:  1.safe transition home with home care in 1-2 days          Team Members Present at Conference:  Physician: Dr Fabien Gao   : [place], [place]    Occupational Therapist: Jaqueline Hodgkin, OTR/L #[id number]  Physical Therapist:Rani Garcia, [address] [facility]  Speech Therapist:   Nurse: Wilber Brunner RN, CRRN  Dietician: Luz Schreiber RD,LD   Min Junior PT, MPT     I led this team conference and I approve the established interdisciplinary plan of care as documented within the medical record of Roya Ba.     MD: Dr. Fabien Gao

## 2019-04-12 NOTE — PROGRESS NOTES
OCCUPATIONAL THERAPY  Progress Note   Second Session    Patient Name: Ronnell Hills Record Number: [id number]    Treatment Diagnosis: impaired functional mobility, unsteady gait    Chart Reviewed: Yes  Additional Pertinent Hx:  Patient is a 77-year-old male with a history of left shoulder arthroplasty in Dec. 2016 that developed an infection post op, and he had incision and drainage and IV antibiotics for 6 weeks, and on oral Cipro for 6 months. He did well until March 2018 when he developed left shoulder pain and swelling again, and was placed on oral antibiotic therapy with a repeat incision and drainage of a left shoulder abscess. He did well with oral antibiotics again until January, 2019 when he developed pain and swelling again while taking po Cipro. In March, he had removal of his left total shoulder arthroplasty prosthesis. He was placed on IV antibiotics, and he is now taking vancomycin and cefepime. He also developed acute kidney injury and was placed on hemodialysis. He was started in therapies, but needs more therapy before discharged to home safely with his family. He is now admitted to our rehabilitation work on goals of improving his transfers, gait, balance, and self-care activities with no weightbearing status of the left arm. He continues on IV antibiotics and hemodialysis. We'll get nephrology consult. (copied per note Dr Sammi Varela 4/10/19)  Response to previous treatment: Patient with no complaints from previous session  Family / Caregiver Present: No  Referring Practitioner: [facility]  Diagnosis: metabolic encephalopathy   Restrictions/Precautions: Weight Bearing  Upper Extremity Weight Bearing Restrictions  Left Upper Extremity Weight Bearing: Non Weight Bearing  Other: shoulder prosthesis removed  Other position/activity restrictions: contact plus precautions for C diff;  HD THS this week, but plan to transition to MWF - will be determined.   TDC right chest, PICC line right upper arm,  sling for left UE when up and moving per patient report    Subjective: Patient seen in OT dept, ready for treat, does not report or indicate any pain     Objective:  Transfers  W/C: Supervision   Toilet with rail on right: Supervision   Bed: Supervision  Recliner: Supervision  Chair with arms outside on terrace: Supervision     Fxl mobility: patient completes fxl mobility throughout OT department, outside on terrace, and in hospital room without AD with Supervision     Bed mobility: Independent flat bed, no HR sit-supine-sit    IADL: strips bed linen and transports to linen cart with Supervision     Cognition: patient appropriate throughout session, followed all directions, alert, oriented     Assessment: Tolerated session well this pm. Completes functional mobilty and transfers with Supervision without AD. Cont per POC.      Safety Device - Type of devices:  []  All fall risk precautions in place [] Bed alarm in place  [x] Call light within reach [] Chair alarm in place [] Positioning belt [x] Gait belt [] Patient at risk for falls [] Left in bed [x] Left in chair [] Telesitter in use [] Sitter present [] Nurse notified []  None      Therapy Time   Individual Co-treatment   Time In 2519     Time Out 1600     Minutes 45       Electronically signed by Artie Galt [place], [address] on 4/12/2019 at 4:06 PM

## 2019-04-12 NOTE — CARE COORDINATION
MICHAW reviewed chart. LSW gowned and gloved to meet with patient, wife and family to introduce  role, initiate discussion regarding DC planning and to inform of weekly reivew of progress during Team Conference on Mondays. He prefers to be called Jaspal Thurston. SOCIAL WORK ASSESSMENT      GOAL:   To return home with wife and assistance from multiple friends and dgtrs. HOME SITUATION:  Patient and wife who has alzheimers disease live in a house with one step entry in the front of the house or two steps in from the garage. He sleeps in the basement which is down 6 plus landing and then 7 more steps. He was totally independent prior to admit, personal care, ambulation and homemaking chores. Wife does dishes and some laundry but does need supervision. PRIOR LEVEL OF FUNCTIONING:       PERSONAL CARE:    independent                                                                       DRIVES:   yes                                                                     FINANCES:  independent                                                                   MEALS:  independent                               GROCERY SHOPS:  indep      DME CURRENTLY AT HOME:   Shower chair      CURRENT HOME CARE/SERVICES:   Active with [facility] for home RN services due to dressing changes. He would like to resume at discharge as needed. PREFERRED HOME CARE:  Prefers to resume [facility]. TEAM CONFERENCE DAY:   Mondays. LSW informed him of weekly review of his progress during Team Conferences to be held on Mondays. Team will review medical concerns, progress in therapy, DME recommendations and DC date. This worker will return to give this update to him. He gives permission for MICHAW to contact his Daughter, Dayanna Haddad, RN Practitioner at [phone]  Or [phone]. Discussion held regarding HD which is all arranged for [facility] 11:45 am chairtime.     [facility] MORIAH     Case Management   [phone]    4/12/2019  1:08 PM

## 2019-04-12 NOTE — PROGRESS NOTES
Department of Physical Medicine & Rehabilitation  Progress Note    Patient Identification:  Sara Gold  [id number]  : 1945  Admit date: 4/10/2019    Chief Complaint: Status post total shoulder arthroplasty, left    Subjective:   No acute events overnight. Patient seen this afternoon sitting up in the gym. Some shortness of breath with exertion which he states is consistent with baseline. CXR ordered to confirm PICC placement. Radiology suggested advancing 4cm however would not be sterile. PICC nurse evaluated and feels it is safe to use. ROS: No f/c, n/v, cp     Objective:  Patient Vitals for the past 24 hrs:   BP Temp Temp src Pulse Resp SpO2 Weight   19 1319 (!) 157/72 98 °F (36.7 °C) Oral 91 18 94 % --   19 1114 -- -- -- -- 16 93 % --   19 0800 132/71 -- -- -- -- -- --   19 0523 133/67 98.7 °F (37.1 °C) Oral 94 16 94 % 199 lb 8.3 oz (90.5 kg)   19 2049 -- -- -- -- -- 94 % --   19 2024 (!) 146/64 98.2 °F (36.8 °C) Oral 84 17 -- 198 lb 10.2 oz (90.1 kg)   19 1609 (!) 162/81 98.6 °F (37 °C) -- 83 -- -- 204 lb 9.4 oz (92.8 kg)     Const: Alert. No distress, pleasant. HEENT: Normocephalic, atraumatic. Normal sclera/conjunctiva. MMM. CV: Regular rate and rhythm. Resp: No respiratory distress. Abd: NABS+. Soft, nontender, nondistended   Ext: No edema. MSK: LUE in sling. Neuro: Alert, oriented, appropriately interactive. Psych: Cooperative, appropriate mood and affect    Laboratory data: Available via EMR.    Last 24 hour lab  Recent Results (from the past 24 hour(s))   Hepatitis B Surface Antibody    Collection Time: 19  5:20 PM   Result Value Ref Range    Hep B S Ab <3.50 mIU/mL   Hepatitis B Surface Antigen    Collection Time: 19  5:20 PM   Result Value Ref Range    Hep B S Ag Interp Non-reactive Non-reactive   Renal Function Panel    Collection Time: 19  5:45 AM   Result Value Ref Range    Sodium 134 (L) 136 - 145 mmol/L

## 2019-04-12 NOTE — FLOWSHEET NOTE
04/11/19 1609 04/11/19 2024   Vital Signs   BP (!) 162/81 (!) 146/64   Temp 98.6 °F (37 °C) 98.2 °F (36.8 °C)   Pulse 83 84   Weight 204 lb 9.4 oz (92.8 kg) 198 lb 10.2 oz (90.1 kg)   Weight Method Actual;Standing scale Actual       tx tolerated well, cramping t/o tx UF goal adjusted, removed 2800 ml net, flowsheets printed and put in pt chart for EMR

## 2019-04-12 NOTE — PLAN OF CARE
Problem: SKIN INTEGRITY  Goal: LTG - patient will maintain/improve skin integrity through proper skin care techniques  4/12/2019 1142 by Shauna Berg RN  Outcome: Ongoing  4/12/2019 0116 by Mauri Rodriguez RN  Outcome: Ongoing  Goal: LTG - Patient will demonstrate appropriate pressure relief techniques  Outcome: Ongoing  Goal: LTG - Patient will be free from infection  Outcome: Ongoing  Goal: STG - Patient exhibits signs of wound healing.   Outcome: Ongoing     Problem: IP MOBILITY  Goal: LTG - patient will be able to go up and down a curb/step with the appropriate device  Outcome: Ongoing  Goal: LTG - Patient will propel wheelchair household/community distances  Outcome: Ongoing  Goal: STG - patient will propel the wheelchair  Outcome: Ongoing     Problem: Pain:  Goal: Pain level will decrease  Description  Pain level will decrease   4/12/2019 1142 by Shauna Berg RN  Outcome: Ongoing  4/12/2019 0116 by Mauri Rodriguez RN  Outcome: Ongoing  Goal: Control of acute pain  Description  Control of acute pain   Outcome: Ongoing  Goal: Control of chronic pain  Description  Control of chronic pain   Outcome: Ongoing     Problem: Mobility - Impaired:  Goal: Mobility will improve  4/12/2019 1142 by Shauna Berg RN  Outcome: Ongoing  4/12/2019 0116 by Mauri Rodriguez RN  Outcome: Ongoing     Problem: Infection - Surgical Site:  Goal: Will show no infection signs and symptoms  4/12/2019 1142 by Shauna Berg RN  Outcome: Ongoing  4/12/2019 0116 by Mauri Rodriguez RN  Outcome: Ongoing     Problem: Pain - Acute:  Goal: Pain level will decrease  Description  Pain level will decrease   4/12/2019 1142 by Shauna Berg RN  Outcome: Ongoing  4/12/2019 0116 by Mauri Rodriguez RN  Outcome: Ongoing     Problem: IP BREATHING  Goal: STG - respiratory rate and effort will be within normal limits for the patient  Outcome: Ongoing  Goal: STG - Patient performs or directs assisted coughing  Outcome: Ongoing     Problem: NUTRITION  Goal: Patient maintains weight  Outcome: Ongoing     Problem: SAFETY  Goal: STG - patient locks brakes on wheelchair  Outcome: Ongoing  Goal: STG - Patient uses call light consistently to request assistance with transfers  Outcome: Ongoing     Problem: PAIN  Goal: LTG - Patient will manage pain with the appropriate technique/Intervention  Outcome: Ongoing  Goal: LTG - Patient will demonstrate intervention for managing pain  Outcome: Ongoing  Goal: STG - Patient will reduce or eliminate use of analgesics  Outcome: Ongoing  Goal: STG - pain is manageable through therapies  Outcome: Ongoing  Goal: STG - Patient will verbalize an acceptable level of pain  Outcome: Ongoing  Goal: STG - patients pain is managed to allow active participation in daily activities  Outcome: Ongoing  Goal: STG - Patient will increase activity level  Outcome: Ongoing  Goal: STG - patient verbalizes a reduction in pain level  Outcome: Ongoing

## 2019-04-12 NOTE — PROGRESS NOTES
RN notified MD order is needed for Xray regarding PICC line. RN from [facility] mentioned while changing PICC dressing, the PICC line catheter displaced slightly and withdrew a few centimeters, unsure how much. RN notified MD. Maggiea Talon for X ray was placed to verify placement.

## 2019-04-12 NOTE — PLAN OF CARE
Problem: SKIN INTEGRITY  Goal: LTG - patient will maintain/improve skin integrity through proper skin care techniques  Skin assessment performed each shift per protocol. Patient turns and repositions self. Bottom without redness with skin intact. Outcome: Ongoing      Problem: Pain:  Description  Pain management should include both nonpharmacologic and pharmacologic interventions. Goal: Pain level will decrease  Description  Pain level will decrease. Patient denied any pain or discomfort. Encouraged to let staff know of changes in condition. Outcome: Ongoing       Problem: Mobility - Impaired:  Goal: Mobility will improve. Patient continues to have limited mobility to left shoulder due to recent arthroplasty. Up to BR with no device, tolerates well and has a steady gait. Outcome: Ongoing      Problem: Infection - Surgical Site:  Goal: Will show no infection signs and symptoms. Surgical site to left shoulder without redness, drainage, or swelling.   Outcome: Ongoing

## 2019-04-13 LAB
ALBUMIN SERPL-MCNC: 3.4 G/DL (ref 3.4–5)
ANION GAP SERPL CALCULATED.3IONS-SCNC: 14 MMOL/L (ref 3–16)
BUN BLDV-MCNC: 45 MG/DL (ref 7–20)
CALCIUM SERPL-MCNC: 8.6 MG/DL (ref 8.3–10.6)
CHLORIDE BLD-SCNC: 97 MMOL/L (ref 99–110)
CO2: 25 MMOL/L (ref 21–32)
CREAT SERPL-MCNC: 6.3 MG/DL (ref 0.8–1.3)
GFR AFRICAN AMERICAN: 11
GFR NON-AFRICAN AMERICAN: 9
GLUCOSE BLD-MCNC: 86 MG/DL (ref 70–99)
HCT VFR BLD CALC: 25.2 % (ref 40.5–52.5)
HEMOGLOBIN: 8.3 G/DL (ref 13.5–17.5)
HEPATITIS B CORE TOTAL ANTIBODY: POSITIVE
MCH RBC QN AUTO: 32.3 PG (ref 26–34)
MCHC RBC AUTO-ENTMCNC: 32.7 G/DL (ref 31–36)
MCV RBC AUTO: 98.8 FL (ref 80–100)
PDW BLD-RTO: 15.9 % (ref 12.4–15.4)
PHOSPHORUS: 5.7 MG/DL (ref 2.5–4.9)
PLATELET # BLD: 182 K/UL (ref 135–450)
PMV BLD AUTO: 9.4 FL (ref 5–10.5)
POTASSIUM SERPL-SCNC: 4.5 MMOL/L (ref 3.5–5.1)
RBC # BLD: 2.55 M/UL (ref 4.2–5.9)
SODIUM BLD-SCNC: 136 MMOL/L (ref 136–145)
WBC # BLD: 5.7 K/UL (ref 4–11)

## 2019-04-13 PROCEDURE — 97116 GAIT TRAINING THERAPY: CPT | Performed by: PHYSICAL THERAPIST

## 2019-04-13 PROCEDURE — 94760 N-INVAS EAR/PLS OXIMETRY 1: CPT

## 2019-04-13 PROCEDURE — 2580000003 HC RX 258: Performed by: PHYSICAL MEDICINE & REHABILITATION

## 2019-04-13 PROCEDURE — 90935 HEMODIALYSIS ONE EVALUATION: CPT

## 2019-04-13 PROCEDURE — 6370000000 HC RX 637 (ALT 250 FOR IP): Performed by: PHYSICAL MEDICINE & REHABILITATION

## 2019-04-13 PROCEDURE — 94660 CPAP INITIATION&MGMT: CPT

## 2019-04-13 PROCEDURE — 85027 COMPLETE CBC AUTOMATED: CPT

## 2019-04-13 PROCEDURE — 6360000002 HC RX W HCPCS: Performed by: PHYSICAL MEDICINE & REHABILITATION

## 2019-04-13 PROCEDURE — 97530 THERAPEUTIC ACTIVITIES: CPT | Performed by: PHYSICAL THERAPIST

## 2019-04-13 PROCEDURE — 80069 RENAL FUNCTION PANEL: CPT

## 2019-04-13 PROCEDURE — 36592 COLLECT BLOOD FROM PICC: CPT

## 2019-04-13 PROCEDURE — 97535 SELF CARE MNGMENT TRAINING: CPT

## 2019-04-13 PROCEDURE — 94640 AIRWAY INHALATION TREATMENT: CPT

## 2019-04-13 PROCEDURE — 6360000002 HC RX W HCPCS: Performed by: INTERNAL MEDICINE

## 2019-04-13 PROCEDURE — 97110 THERAPEUTIC EXERCISES: CPT

## 2019-04-13 PROCEDURE — 97530 THERAPEUTIC ACTIVITIES: CPT

## 2019-04-13 PROCEDURE — 97110 THERAPEUTIC EXERCISES: CPT | Performed by: PHYSICAL THERAPIST

## 2019-04-13 PROCEDURE — 1280000000 HC REHAB R&B

## 2019-04-13 RX ORDER — HEPARIN SODIUM 1000 [USP'U]/ML
4000 INJECTION, SOLUTION INTRAVENOUS; SUBCUTANEOUS PRN
Status: DISCONTINUED | OUTPATIENT
Start: 2019-04-13 | End: 2019-04-16 | Stop reason: HOSPADM

## 2019-04-13 RX ORDER — DIAPER,BRIEF,INFANT-TODD,DISP
EACH MISCELLANEOUS 2 TIMES DAILY
Status: DISCONTINUED | OUTPATIENT
Start: 2019-04-13 | End: 2019-04-16 | Stop reason: HOSPADM

## 2019-04-13 RX ADMIN — VANCOMYCIN HYDROCHLORIDE 125 MG: 125 CAPSULE ORAL at 05:49

## 2019-04-13 RX ADMIN — VANCOMYCIN HYDROCHLORIDE 125 MG: 125 CAPSULE ORAL at 18:10

## 2019-04-13 RX ADMIN — TRAZODONE HYDROCHLORIDE 25 MG: 50 TABLET ORAL at 20:38

## 2019-04-13 RX ADMIN — HEPARIN SODIUM 5000 UNITS: 5000 INJECTION INTRAVENOUS; SUBCUTANEOUS at 14:24

## 2019-04-13 RX ADMIN — FOLIC ACID 1 MG: 1 TABLET ORAL at 14:25

## 2019-04-13 RX ADMIN — Medication 100 MG: at 14:23

## 2019-04-13 RX ADMIN — MOMETASONE FUROATE AND FORMOTEROL FUMARATE DIHYDRATE 1 PUFF: 100; 5 AEROSOL RESPIRATORY (INHALATION) at 19:43

## 2019-04-13 RX ADMIN — THERA TABS 1 TABLET: TAB at 14:22

## 2019-04-13 RX ADMIN — CEFEPIME 2 G: 2 INJECTION, POWDER, FOR SOLUTION INTRAVENOUS at 20:38

## 2019-04-13 RX ADMIN — VERAPAMIL HYDROCHLORIDE 240 MG: 240 TABLET, FILM COATED, EXTENDED RELEASE ORAL at 14:23

## 2019-04-13 RX ADMIN — ALLOPURINOL 200 MG: 100 TABLET ORAL at 14:23

## 2019-04-13 RX ADMIN — ALBUTEROL SULFATE 2.5 MG: 2.5 SOLUTION RESPIRATORY (INHALATION) at 18:26

## 2019-04-13 RX ADMIN — Medication 10 ML: at 14:29

## 2019-04-13 RX ADMIN — VANCOMYCIN HYDROCHLORIDE 125 MG: 125 CAPSULE ORAL at 14:24

## 2019-04-13 RX ADMIN — MOMETASONE FUROATE AND FORMOTEROL FUMARATE DIHYDRATE 1 PUFF: 100; 5 AEROSOL RESPIRATORY (INHALATION) at 09:36

## 2019-04-13 RX ADMIN — ASPIRIN 81 MG: 81 TABLET ORAL at 14:23

## 2019-04-13 RX ADMIN — HEPARIN SODIUM 5000 UNITS: 5000 INJECTION INTRAVENOUS; SUBCUTANEOUS at 20:38

## 2019-04-13 RX ADMIN — Medication 10 ML: at 20:49

## 2019-04-13 RX ADMIN — HYDROCORTISONE: 1 CREAM TOPICAL at 18:10

## 2019-04-13 RX ADMIN — HYDROCORTISONE: 1 CREAM TOPICAL at 20:38

## 2019-04-13 RX ADMIN — HEPARIN SODIUM 5000 UNITS: 5000 INJECTION INTRAVENOUS; SUBCUTANEOUS at 05:49

## 2019-04-13 RX ADMIN — DARBEPOETIN ALFA 40 MCG: 40 SOLUTION INTRAVENOUS; SUBCUTANEOUS at 11:11

## 2019-04-13 ASSESSMENT — PAIN SCALES - GENERAL
PAINLEVEL_OUTOF10: 0
PAINLEVEL_OUTOF10: 0

## 2019-04-13 NOTE — PLAN OF CARE
Problem: SKIN INTEGRITY  Goal: LTG - Patient will demonstrate appropriate pressure relief techniques. Patient turns and repositions self through out the night while in bed. No new redness or bruising identified since last eveving. Problem: SKIN INTEGRITY  4/13/2019 0105 by Anette Haque RN  Outcome: Ongoing      Problem: Pain:  Description  Pain management should include both nonpharmacologic and pharmacologic interventions. Goal: Control of acute pain  Description  Control of acute pain. Patient does not verbalize complaints of pain. When questioned states, \"mild discomfort to left shoulder with increased movement\". Patient denied the need for any non-pharmaceutical or pharmaceutical interventions. 4/13/2019 0105 by Anette Haque RN  Outcome: Ongoing      Problem: Mobility - Impaired:  Goal: Mobility will improve. Patient is up with no device, supervision only. Noted with a steady gait. Fall precautions remain in place for safety. Treaded socks in use to maintain safety. 4/13/2019 0105 by Anette Haque RN  Outcome: Met This Shift      Problem: Infection - Surgical Site:  Goal: Will show no infection signs and symptoms. Left  shoulder surgical site is without s/s of infection.   4/13/2019 0105 by Anette Haque RN  Outcome: Ongoing

## 2019-04-13 NOTE — FLOWSHEET NOTE
Treatment time: 4 hour     Net UF: 1.9 Liters     Pre weight: 91.4kg standing scale  Post weight: 89.2 kg standing scale   EDW: 89.5 New target wt today set by MD    Access used: R TDC   Access function: good, lines reversed     Medications or blood products given: Aranesp 40 mcg     Regular outpatient schedule: (Outpt clinic to be [facility])     Summary of response to treatment: 1.9 liters fluid removed with 4 hour Dialysis. Pt tolerated tx well. Aranesp 40 mcg given. Post VSS. Copy of dialysis treatment record placed in chart, to be scanned into EMR.     04/13/19 0756 04/13/19 1156   Observations & Evaluations   Pulse 84 90   Vital Signs   BP (!) 148/72 (!) 144/80   Temp 97.9 °F (36.6 °C) 98.1 °F (36.7 °C)   Weight 201 lb 8 oz (91.4 kg) 196 lb 10.4 oz (89.2 kg)   Weight Method Actual;Standing scale Actual;Standing scale   Treatment Initiation   Dialyze Hours 4  --    Treatment  Initiation Universal Precautions maintained;Lines secured to patient; Connections secured;Prime given;Venous Parameters set; Arterial Parameters set; Air foam detector engaged; Hemosafe Device; Dialysate;Saline line double clamped;F160  --    Post-Hemodialysis Assessment   Post-Treatment Procedures  --  Blood returned;Catheter capped, clamped and heparinized x 2 ports   Hemodialysis Intake (ml)  --  300 ml   Hemodialysis Output (ml)  --  2300 ml   NET Removed (ml)  --  2000 ml   Tolerated Treatment  --  Good

## 2019-04-13 NOTE — PROGRESS NOTES
Physical Therapy  Facility/Department: [address] REHAB  Daily Treatment Note  NAME: Sara Gold  : 1945  MRN: [id number]    Date of Service: 2019    Discharge Recommendations:  Continue to assess pending progress, Home with assist PRN, Patient would benefit from continued therapy after discharge   PT Equipment Recommendations  Other: currently using no assistive device    Patient Diagnosis(es): There were no encounter diagnoses. has a past medical history of Asthma, Hypertension, and Sleep apnea. has a past surgical history that includes eye surgery; Appendectomy; shoulder surgery; and Colonoscopy (2017). Restrictions  Restrictions/Precautions  Restrictions/Precautions: Weight Bearing  Upper Extremity Weight Bearing Restrictions  Left Upper Extremity Weight Bearing: Non Weight Bearing  Other: shoulder prosthesis removed  Position Activity Restriction  Other position/activity restrictions: contact plus precautions for C diff;  HD THS this week, but plan to transition to MWF - beginning 4/15. TDC right chest, PICC line right upper arm,  sling for left UE when up and moving per patient report  Subjective   General  Chart Reviewed: Yes  Additional Pertinent Hx: Per Dr. Vale Art - Patient is a 81-year-old male with a history of left shoulder arthroplasty in Dec. 2016 that developed an infection post op, and he had incision and drainage and IV antibiotics for 6 weeks, and on oral Cipro for 6 months. He did well until 2018 when he developed left shoulder pain and swelling again, and was placed on oral antibiotic therapy with a repeat incision and drainage of a left shoulder abscess. He did well with oral antibiotics again until  when he developed pain and swelling again while taking po Cipro. In March, he had removal of his left total shoulder arthroplasty prosthesis. He was placed on IV antibiotics, and he is now taking vancomycin and cefepime.  He also developed acute kidney injury and was placed on hemodialysis. He was started in therapies, but needs more therapy before discharged to home safely with his family. He is now admitted to our rehabilitation work on goals of improving his transfers, gait, balance, and self-care activities with no weightbearing status of the left arm. He continues on IV antibiotics and hemodialysis. We'll get nephrology consult. Family / Caregiver Present: No  Referring Practitioner: Troy Emanuel MD  Subjective  Subjective: Patient reports doing well, no pain, tingling in feet. Had dialysis earlier this morning which finished about noon, just finished with OT session. Agreeable to PT session. Slept well last night. Patient presents with dry cough which began yesterday, patient concerned that he has not properly cleaned his CPAP in the past several weeks.             Orientation   WFL  Cognition   Cognition  Overall Cognitive Status: WFL  Objective      Transfers  Sit to Stand: Stand by assistance  Stand to sit: Stand by assistance  Ambulation  Ambulation?: Yes  WB Status: sling when up and moving  Ambulation 1  Surface: level tile;carpet  Device: No Device  Assistance: Stand by assistance  Quality of Gait: quick pace, occasional cues to slow down and attend to R side as patient reports poor vision and depth perception, fairly steady  Distance: 300', shorter distances with multiple turns to access curb and steps, 100' back to room at end of session and then shorter distances in room while managing cleaning of CPAP  Stairs/Curb  Stairs?: Yes  Stairs  # Steps : 12  Rails: Right ascending  Curbs: 6\"  Device: No Device  Other Apparatus: (L UE sling in place throughout session)  Assistance: Contact guard assistance;Stand by assistance  Comment: turned sidways to descend 12 steps with close SBA, cue for safely placing entire foot on steps with ascendings steps; able to ascend/descend curb step without UE support and without hesitation despite limited balance due to L UE in sling - O2 sats to 91% after flight of steps, but returned to 98% with just couple minutes of seated rest.     Balance  Comments: static standing balance on Airex foam x 4 minutes without UE support and CGA, consistent ankle strategy to maintain balance with patient able to communicate/talk with surrounding people while working on standing balance  Exercises  Comments: Standing exercises with parallel bar with RUE: heel/toe raises, mini squats, marching, (seated rest), hip abd, hip ext, and knee flex x 15 reps each LE. Step ups forward and lateral with 6\" step x 10 reps each LE. Other exercises  Other exercises?: Yes  Other exercises 1: NuStep - resistance 4, seat 10, R arm only 9 x 10 minutes - average steps per minute 74         Other Activities: Other (see comment)  Comment: Drips of blood noted during standing balance activity, so during seated rest determined source of blood was in R lower abdomen where his Levonox shot was administered just prior to therapy session. PT consulted with Aristides Peterson RN, who agreed that cleaning with alcohol wipe and covering with bandage would be appropriate, so this writer cleaned and covered. When patient returned to his room, able to retrieve and clean CPAP mask under hot water as would at home, but has not done in several weeks since he has been ill. PT assisted with thoroughness of cleaning parts of CPAP. Informed MIKE Fletcher, of issue with bleeding, which appears to be under control at the conclusion of PT session, and that assisted patient with cleaning CPAP parts which are currently air drying in the patient's bathroom. Assessment   Body structures, Functions, Activity limitations: Decreased functional mobility ; Decreased strength;Decreased endurance  Assessment: Patient presents to inpatient rehab with diagnosis of metabolic encephalopathy with infection of left total shoulder replacement that now has a spacer in place.   Patient is wearing a sling for support and protection of left shoulder joint and is aware of continued need to move elbow and wrist.  Patient has impaired sensation in bilateral forefeet and impaired vision/depth perception, especially when looking down with his bifocal glasses, which may limit safety with functional ambulation. Patient was able to ambulate 300' with no device and SBA/CGA on level surfaces, curb and 12 steps with 1 rail, with minimal to moderate shortness of breath, O2 sats to 91% after exertion. Patient continues to improve with balance activities, but endurance is limited with shortness of breath and now a mild, persistant dry cough. Patient would benefit from continued therapies on inpatient rehab to maximize safety, strength, endurance, and independence with funcitional mobility prior to retuning home with his wife who has dementia and intermittent assist from their children. Anticipate D/C early to mid next week.   Treatment Diagnosis: impaired functional mobility, unsteady gait  Prognosis: Good  Patient Education: safety with functional mobility, higher level balance activities and LE strengthening exercises  REQUIRES PT FOLLOW UP: Yes  Activity Tolerance  Activity Tolerance: Patient Tolerated treatment well     Goals  Short term goals  Time Frame for Short term goals: Patient will in 1 week:   Short term goal 1: bed mobility independently  Short term goal 2: transfers independently  Short term goal 3: ambulate 200' with no device indpendently on level surfaces  Short term goal 4: ascend/descend curb step with no device or rail and close SBA/CGA for steadying  Short term goal 5: ascend/descend 12 steps with 1 rails and supervision  Long term goals  Time Frame for Long term goals : STGs=LTGs  Patient Goals   Patient goals : return home with his wife who has dementia and be able to cook and clean for her as previously, with intermittent help from their children; strengthen legs so can walk and move

## 2019-04-13 NOTE — PROGRESS NOTES
Nephrology Attending Progress Note      SUBJECTIVE:  We are following this patient for PETRA. The patient was admitted to [address] [place] [address] last week with worsening edema, altered mental status and PETRA. Serologic work up has been negative. Initiated on HD 4/3/19 and shows no signs of recovery of kidney function yet. Interval History:  -No acute events overnight  -Seen on HD today      ROS: No new or active complaints  SHx: No family present at bedside      Scheduled Meds:   sodium chloride flush  10 mL Intravenous BID    aspirin  81 mg Oral Daily    multivitamin  1 tablet Oral Daily    folic acid  1 mg Oral Daily    thiamine  100 mg Oral Daily    vancomycin  125 mg Oral 4 times per day    verapamil  240 mg Oral Daily    heparin (porcine)  5,000 Units Subcutaneous 3 times per day    mometasone-formoterol  1 puff Inhalation BID    allopurinol  200 mg Oral Daily    cefepime  2 g Intravenous Once per day on  Sat    traZODone  25 mg Oral Nightly     Continuous Infusions:  PRN Meds:.sennosides-docusate sodium, alteplase, albuterol, acetaminophen, ondansetron    Physical Exam:    TEMPERATURE:  Current - Temp: 97.9 °F (36.6 °C); Max - Temp  Av.2 °F (36.8 °C)  Min: 97.9 °F (36.6 °C)  Max: 98.7 °F (37.1 °C)  RESPIRATIONS RANGE: Resp  Av.7  Min: 16  Max: 18  PULSE RANGE: Pulse  Av  Min: 77  Max: 91  BLOOD PRESSURE RANGE:  Systolic (60BWU), PNV:663 , Min:116 , FQW:804   ; Diastolic (83ALA), UHJ:76, Min:63, Max:72    24HR INTAKE/OUTPUT:      Intake/Output Summary (Last 24 hours) at 2019 0907  Last data filed at 2019 1835  Gross per 24 hour   Intake 480 ml   Output --   Net 480 ml     Gen: NAD  HEENT: Sclera anicteric, MMM  Neck: Supple. No JVD  CV: RRR, S1/S2, No R/M/G  Pulm: (+) Expiratory wheezing  Abd: Soft, NT, ND, (+)BS  Ext: 1+ pitting edema in B/L LE  Neuro: Awake/Alert, Answers questions appropriately  Skin: Warm.  No visible

## 2019-04-13 NOTE — PLAN OF CARE
Problem: SKIN INTEGRITY  Goal: LTG - patient will maintain/improve skin integrity through proper skin care techniques  Outcome: Ongoing  Note:   Skin assessment performed each shift per protocol. Skin care protocol in place. Problem: Pain:  Goal: Control of acute pain  Description  Control of acute pain   Outcome: Ongoing  Note:   Patient able to express pain and rate pain using pain scale. Medicate as needed per orders. Reassess patient pain level within one hour after oral pain medication/intervention and 30 minutes after IV pain medication to assure patient has reduced pain sensation and document outcome. Non pharmaceutical interventions as appropriate. Problem: PAIN  Goal: LTG - Patient will manage pain with the appropriate technique/Intervention  Outcome: Ongoing  Note:   Pain/discomfort being managed with PRN analgesics per MD orders. Pt able to express presence and absence of pain and rate pain appropriately using numerical scale.

## 2019-04-13 NOTE — PROGRESS NOTES
Occupational Therapy  Facility/Department: [address] IP REHAB  Daily Treatment Note  NAME: Lb Mckeon  : 1945  MRN: [id number]    Date of Service: 2019    Discharge Recommendations:  Home with assist PRN       Assessment   Performance deficits / Impairments: Decreased functional mobility ; Decreased endurance;Decreased high-level IADLs;Decreased ADL status; Decreased balance  Assessment: Pt completed self care with SBA, assisted only minimally with sling. Transfers and mobility with SBA, no device, but does use grab bars for sit/ shower  Treatment Diagnosis: decreased ADL, balance, mobility, activity tolerance  Prognosis: Good  REQUIRES OT FOLLOW UP: Yes  Activity Tolerance  Activity Tolerance: Patient Tolerated treatment well  Safety Devices  Safety Devices in place: Yes  Type of devices: Gait belt(PT with pt at end of session)              has a past medical history of Asthma, Hypertension, and Sleep apnea. has a past surgical history that includes eye surgery; Appendectomy; shoulder surgery; and Colonoscopy (2017). Restrictions  Restrictions/Precautions  Restrictions/Precautions: Weight Bearing  Upper Extremity Weight Bearing Restrictions  Left Upper Extremity Weight Bearing: Non Weight Bearing  Other: shoulder prosthesis removed  Position Activity Restriction  Other position/activity restrictions: contact plus precautions for C diff;  HD THS this week, but plan to transition to MWF - beginning 4/15. TDC right chest, PICC line right upper arm,  sling for left UE when up and moving per patient report  Subjective   General  Chart Reviewed: Yes  Additional Pertinent Hx:  Patient is a 77-year-old male with a history of left shoulder arthroplasty in Dec. 2016 that developed an infection post op, and he had incision and drainage and IV antibiotics for 6 weeks, and on oral Cipro for 6 months.  He did well until 2018 when he developed left shoulder pain and swelling again, and was placed on oral antibiotic therapy with a repeat incision and drainage of a left shoulder abscess. He did well with oral antibiotics again until January, 2019 when he developed pain and swelling again while taking po Cipro. In March, he had removal of his left total shoulder arthroplasty prosthesis. He was placed on IV antibiotics, and he is now taking vancomycin and cefepime. He also developed acute kidney injury and was placed on hemodialysis. He was started in therapies, but needs more therapy before discharged to home safely with his family. He is now admitted to our rehabilitation work on goals of improving his transfers, gait, balance, and self-care activities with no weightbearing status of the left arm. He continues on IV antibiotics and hemodialysis. We'll get nephrology consult. (copied per note Dr Jorge Matute 4/10/19)  Response to previous treatment: Patient with no complaints from previous session  Family / Caregiver Present: Yes(wife and step daughter)  Referring Practitioner: [facility]  Diagnosis: metabolic encephalopathy  Subjective  Subjective: pt met bedside, agreeable to OT, contact precautions, no specific pain, just a little in my feet      Orientation  Orientation  Overall Orientation Status: Within Functional Limits  Objective    ADL  Grooming: Setup;Stand by assistance(standing at sink to shave, stated he brushed teeth this AM)  UE Bathing: Setup  LE Bathing: Stand by assistance(seated on shower chair for majority of shower, stood with light touch to grab bar with left UE - reminded pt not to use left arm for support)  UE Dressing: Setup;Stand by assistance(shirt per self, min assist for sling)  LE Dressing: Stand by assistance;Verbal cueing;Setup(donned pants/underwear, socks per self after set up, one cue for underwear inside out)  Additional Comments: covered TDC and PICC line with waterproof dressing prioir to shower   Improved activity tolerance, able to stand to shave after shower.      Functional Mobility  Functional - Mobility Device: No device  Activity: To/from bathroom;Transport items; Retrieve items  Assist Level: Stand by assistance  Shower Transfers  Shower - Transfer From: Other  Shower - Transfer Type: To and From  Shower - Transfer To: Shower seat with back  Shower - Technique: Ambulating  Shower Transfers: Stand by assistance                             Cognition  Overall Cognitive Status: WFL                    Type of ROM/Therapeutic Exercise  Comment: right UE - 2 lb dumbbell   Exercises  Shoulder Flexion: 10  Shoulder Extension: 10  Horizontal ABduction: 10  Horizontal ADduction: 10  Elbow Flexion: 10  Elbow Extension: 10                    Plan   Plan  Times per week: 5-6 days per week  Times per day: Twice a day  Plan weeks: 1 week   Current Treatment Recommendations: Functional Mobility Training, Patient/Caregiver Education & Training, Home Management Training, Endurance Training, Equipment Evaluation, Education, & procurement, Balance Training, Safety Education & Training, Self-Care / ADL               Goals  Short term goals  Time Frame for Short term goals: 1 week pt will.   Short term goal 1: bathe indep, set up to cover PICC and port  Short term goal 2: dress indep  Short term goal 3: toilet indep with grab bar - AD as needed  Short term goal 4: transfer indep without device, but UE support as needed  Short term goal 5: functional mobility indep, use of sling as AD  Long term goals  Time Frame for Long term goals : same as stg  Patient Goals   Patient goals : \"I want to be able to feel safe walking around, taking care of myself\"       Therapy Time   Individual Concurrent Group Co-treatment   Time In 1320         Time Out 1450         Minutes 90         Timed Code Treatment Minutes: 84 Venessa Spence, OTR/L 770

## 2019-04-13 NOTE — PROGRESS NOTES
Patient admitted to rehab s/p left shoulder arthroplasty. Patient is AAO x 4, pleasant and cooperative with care. Assessment completed. Abdomen is grossly rotund, patient states that this is his norm for the past several years. Remains in contact isolation for recent positive result of C-diff. Patient  continent of 1 formed stools this evening, moderate amt, brown in color. Patient toilets self independently with staff supervision. Patient is currently resting quietly and in no apparent distress. No complaints of pain  Verbalized while awake. Patient does have mild discomfort to left shoulder surgical site with movement. Call light and belongings remain in reach. Will continue to monitor and assist as needed.

## 2019-04-14 LAB
ALBUMIN SERPL-MCNC: 3.5 G/DL (ref 3.4–5)
ANION GAP SERPL CALCULATED.3IONS-SCNC: 14 MMOL/L (ref 3–16)
BUN BLDV-MCNC: 31 MG/DL (ref 7–20)
CALCIUM SERPL-MCNC: 8.6 MG/DL (ref 8.3–10.6)
CHLORIDE BLD-SCNC: 100 MMOL/L (ref 99–110)
CO2: 26 MMOL/L (ref 21–32)
CREAT SERPL-MCNC: 5.1 MG/DL (ref 0.8–1.3)
GFR AFRICAN AMERICAN: 14
GFR NON-AFRICAN AMERICAN: 11
GLUCOSE BLD-MCNC: 95 MG/DL (ref 70–99)
PHOSPHORUS: 5 MG/DL (ref 2.5–4.9)
POTASSIUM SERPL-SCNC: 4.3 MMOL/L (ref 3.5–5.1)
SODIUM BLD-SCNC: 140 MMOL/L (ref 136–145)

## 2019-04-14 PROCEDURE — 6370000000 HC RX 637 (ALT 250 FOR IP): Performed by: PHYSICAL MEDICINE & REHABILITATION

## 2019-04-14 PROCEDURE — 6360000002 HC RX W HCPCS: Performed by: PHYSICAL MEDICINE & REHABILITATION

## 2019-04-14 PROCEDURE — 1280000000 HC REHAB R&B

## 2019-04-14 PROCEDURE — 94660 CPAP INITIATION&MGMT: CPT

## 2019-04-14 PROCEDURE — 80069 RENAL FUNCTION PANEL: CPT

## 2019-04-14 PROCEDURE — 6370000000 HC RX 637 (ALT 250 FOR IP): Performed by: INTERNAL MEDICINE

## 2019-04-14 PROCEDURE — 2580000003 HC RX 258: Performed by: PHYSICAL MEDICINE & REHABILITATION

## 2019-04-14 PROCEDURE — 94760 N-INVAS EAR/PLS OXIMETRY 1: CPT

## 2019-04-14 PROCEDURE — 94640 AIRWAY INHALATION TREATMENT: CPT

## 2019-04-14 RX ORDER — TORSEMIDE 20 MG/1
50 TABLET ORAL DAILY
Status: DISCONTINUED | OUTPATIENT
Start: 2019-04-14 | End: 2019-04-16 | Stop reason: HOSPADM

## 2019-04-14 RX ADMIN — ALBUTEROL SULFATE 2.5 MG: 2.5 SOLUTION RESPIRATORY (INHALATION) at 10:38

## 2019-04-14 RX ADMIN — HEPARIN SODIUM 5000 UNITS: 5000 INJECTION INTRAVENOUS; SUBCUTANEOUS at 13:05

## 2019-04-14 RX ADMIN — ALLOPURINOL 200 MG: 100 TABLET ORAL at 10:05

## 2019-04-14 RX ADMIN — FOLIC ACID 1 MG: 1 TABLET ORAL at 10:05

## 2019-04-14 RX ADMIN — Medication 10 ML: at 10:06

## 2019-04-14 RX ADMIN — HYDROCORTISONE: 1 CREAM TOPICAL at 21:16

## 2019-04-14 RX ADMIN — HEPARIN SODIUM 5000 UNITS: 5000 INJECTION INTRAVENOUS; SUBCUTANEOUS at 05:28

## 2019-04-14 RX ADMIN — Medication 100 MG: at 10:05

## 2019-04-14 RX ADMIN — THERA TABS 1 TABLET: TAB at 10:05

## 2019-04-14 RX ADMIN — MOMETASONE FUROATE AND FORMOTEROL FUMARATE DIHYDRATE 1 PUFF: 100; 5 AEROSOL RESPIRATORY (INHALATION) at 08:53

## 2019-04-14 RX ADMIN — VANCOMYCIN HYDROCHLORIDE 125 MG: 125 CAPSULE ORAL at 13:05

## 2019-04-14 RX ADMIN — MOMETASONE FUROATE AND FORMOTEROL FUMARATE DIHYDRATE 1 PUFF: 100; 5 AEROSOL RESPIRATORY (INHALATION) at 20:19

## 2019-04-14 RX ADMIN — VERAPAMIL HYDROCHLORIDE 240 MG: 240 TABLET, FILM COATED, EXTENDED RELEASE ORAL at 10:05

## 2019-04-14 RX ADMIN — VANCOMYCIN HYDROCHLORIDE 125 MG: 125 CAPSULE ORAL at 18:23

## 2019-04-14 RX ADMIN — ASPIRIN 81 MG: 81 TABLET ORAL at 10:05

## 2019-04-14 RX ADMIN — TRAZODONE HYDROCHLORIDE 25 MG: 50 TABLET ORAL at 21:16

## 2019-04-14 RX ADMIN — Medication 10 ML: at 21:18

## 2019-04-14 RX ADMIN — VANCOMYCIN HYDROCHLORIDE 125 MG: 125 CAPSULE ORAL at 00:06

## 2019-04-14 RX ADMIN — HEPARIN SODIUM 5000 UNITS: 5000 INJECTION INTRAVENOUS; SUBCUTANEOUS at 21:16

## 2019-04-14 RX ADMIN — HYDROCORTISONE: 1 CREAM TOPICAL at 10:05

## 2019-04-14 RX ADMIN — VANCOMYCIN HYDROCHLORIDE 125 MG: 125 CAPSULE ORAL at 05:28

## 2019-04-14 RX ADMIN — TORSEMIDE 50 MG: 20 TABLET ORAL at 18:27

## 2019-04-14 ASSESSMENT — PAIN SCALES - GENERAL
PAINLEVEL_OUTOF10: 0
PAINLEVEL_OUTOF10: 0

## 2019-04-14 NOTE — PROGRESS NOTES
Patient oriented to person, place, time. Vital signs stable. Patient remains on room air only. Patients currently denies pain. Shift assessment completed. Medication administration completed without any complications. Patient is up with SBA. Requiring Minimal contact assistance. Patients tolerating General with  thin liquids. . Remains continent of bowel and bladder. Patient requesting breathing tx. Call light within reach. Will continue to monitor.

## 2019-04-14 NOTE — PROGRESS NOTES
Pt awake and AAO sitting up in chair watching TV. Denies pain at present. Pt s/p L total shoulder arthroplasty. Pt is Contact Plus for cdiff. On po Vanco. Pt reports that he is no longer having loose stools. Lungs with rhonchi noted. Pt does have dry nonproductive cough. Pt does report scratchy throat. No sob. Cpap at hs. Belly round and distended with BS. Pt reports that his abdomen has been distended for \" 3 years. \" LBM yesterday. Pt is on HD T, Th, Sat. Vas cath to R chest CDI. R PICC site and dressing WDL. Pt is on Cefepime IV. Pt does have red flat itchy rash to bilat arms and abdomen, and upper thighs. Scheduled Hydrocortisone cream to be applied per orders. Call light in reach. Chair alarm on.

## 2019-04-14 NOTE — PROGRESS NOTES
Nephrology Attending Progress Note      SUBJECTIVE:  We are following this patient for PETRA. The patient was admitted to [address] [place] [address] last week with worsening edema, altered mental status and PETRA. Serologic work up has been negative. Initiated on HD 4/3/19 and shows no signs of recovery of kidney function yet. Interval History:  -S/p HD yesterday (UF 1.9L)  -No acute events overnight      ROS: No new or active complaints  SHx: No family present at bedside      Scheduled Meds:   darbepoetin juan-polysorbate  40 mcg Subcutaneous Weekly    hydrocortisone   Topical BID    sodium chloride flush  10 mL Intravenous BID    aspirin  81 mg Oral Daily    multivitamin  1 tablet Oral Daily    folic acid  1 mg Oral Daily    thiamine  100 mg Oral Daily    vancomycin  125 mg Oral 4 times per day    verapamil  240 mg Oral Daily    heparin (porcine)  5,000 Units Subcutaneous 3 times per day    mometasone-formoterol  1 puff Inhalation BID    allopurinol  200 mg Oral Daily    cefepime  2 g Intravenous Once per day on  Sat    traZODone  25 mg Oral Nightly     Continuous Infusions:  PRN Meds:.heparin (porcine), sennosides-docusate sodium, alteplase, albuterol, acetaminophen, ondansetron    Physical Exam:    TEMPERATURE:  Current - Temp: 98.1 °F (36.7 °C); Max - Temp  Av.9 °F (36.6 °C)  Min: 97.3 °F (36.3 °C)  Max: 98.2 °F (36.8 °C)  RESPIRATIONS RANGE: Resp  Av.3  Min: 18  Max: 19  PULSE RANGE: Pulse  Av.7  Min: 80  Max: 98  BLOOD PRESSURE RANGE:  Systolic (82SMT), WAM:476 , Min:121 , DBB:051   ; Diastolic (09WVQ), SDA:35, Min:51, Max:65    24HR INTAKE/OUTPUT:      Intake/Output Summary (Last 24 hours) at 2019 1405  Last data filed at 2019 1050  Gross per 24 hour   Intake 770 ml   Output --   Net 770 ml     Gen: NAD  HEENT: Sclera anicteric, MMM  Neck: Supple.  No JVD  CV: RRR, S1/S2, No R/M/G  Pulm: Mild expiratory wheezing  Abd: Soft, NT, ND, (+)BS  Ext: 1+ pitting edema in B/L LE  Neuro: Awake/Alert, Answers questions appropriately  Skin: Warm. No visible rashes appreciated  Access: R IJ TDC        LAB DATA:    CBC:   Lab Results   Component Value Date    WBC 5.7 04/13/2019    RBC 2.55 04/13/2019    HGB 8.3 04/13/2019    HCT 25.2 04/13/2019    MCV 98.8 04/13/2019    MCH 32.3 04/13/2019    MCHC 32.7 04/13/2019    RDW 15.9 04/13/2019     04/13/2019    MPV 9.4 04/13/2019     BMP:    Lab Results   Component Value Date     04/14/2019    K 4.3 04/14/2019    K 4.3 04/11/2019     04/14/2019    CO2 26 04/14/2019    BUN 31 04/14/2019    CREATININE 5.1 04/14/2019    CALCIUM 8.6 04/14/2019    GFRAA 14 04/14/2019    LABGLOM 11 04/14/2019    GLUCOSE 95 04/14/2019     Ionized Calcium:  No results found for: IONCA  Magnesium:    Lab Results   Component Value Date    MG 1.80 04/11/2019       IMPRESSION/RECOMMENDATIONS:      Principal Problem:    Status post total shoulder arthroplasty, left  Resolved Problems:    * No resolved hospital problems. *      PETRA:  -Etiology likely due to ATN  -Started HD on 4/3/19  -No sign of recovery of kidney function  -S/p HD yesterday (UF 1.9L)  -Transition to [facility] schedule for outpatient HD unit  -Outpatient HD unit: [address] [place] [address] at 11:45 AM  -Last post-HD weight 89.5kg.  Set as new TW  -Start torsemide 50mg PO daily  -Will need renal biopsy at some point next week (delayed at [facility] due to thrombocytopenia)    Hematuria/Proteinuria:  -UPCR 1.2g  -GN work up negative to date  -Renal biopsy as above    HTN:  -At goal  -Continue current regimen  -Challenge TW as above    Hyperkalemia:  -At goal  -Modulate with HD    Metabolic Acidosis:  -At goal  -Modulate with HD    Anemia of CKD:  -Below goal  -Would avoid IV iron in setting of active infection  -Started aranesp with HD yesterday  -Transfusion threshold per primary team    CKD-MBD:  -At goal  -Not on Phos binders    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  -Management per Primary Team    C. Diff:  -On PO Vanco  -Management per Primary Team        Joon Lerma MD, [facility]  04/14/19  [facility]

## 2019-04-15 LAB
ALBUMIN SERPL-MCNC: 3.4 G/DL (ref 3.4–5)
ANION GAP SERPL CALCULATED.3IONS-SCNC: 15 MMOL/L (ref 3–16)
BUN BLDV-MCNC: 45 MG/DL (ref 7–20)
CALCIUM SERPL-MCNC: 8.9 MG/DL (ref 8.3–10.6)
CHLORIDE BLD-SCNC: 102 MMOL/L (ref 99–110)
CO2: 24 MMOL/L (ref 21–32)
CREAT SERPL-MCNC: 6.5 MG/DL (ref 0.8–1.3)
GFR AFRICAN AMERICAN: 10
GFR NON-AFRICAN AMERICAN: 8
GLUCOSE BLD-MCNC: 91 MG/DL (ref 70–99)
HCT VFR BLD CALC: 26.4 % (ref 40.5–52.5)
HEMOGLOBIN: 8.6 G/DL (ref 13.5–17.5)
MCH RBC QN AUTO: 32.6 PG (ref 26–34)
MCHC RBC AUTO-ENTMCNC: 32.5 G/DL (ref 31–36)
MCV RBC AUTO: 100.4 FL (ref 80–100)
PDW BLD-RTO: 15.6 % (ref 12.4–15.4)
PHOSPHORUS: 5.9 MG/DL (ref 2.5–4.9)
PLATELET # BLD: 192 K/UL (ref 135–450)
PMV BLD AUTO: 9 FL (ref 5–10.5)
POTASSIUM SERPL-SCNC: 4.5 MMOL/L (ref 3.5–5.1)
RBC # BLD: 2.63 M/UL (ref 4.2–5.9)
SODIUM BLD-SCNC: 141 MMOL/L (ref 136–145)
WBC # BLD: 5.6 K/UL (ref 4–11)

## 2019-04-15 PROCEDURE — 97530 THERAPEUTIC ACTIVITIES: CPT

## 2019-04-15 PROCEDURE — 97110 THERAPEUTIC EXERCISES: CPT

## 2019-04-15 PROCEDURE — 6360000002 HC RX W HCPCS: Performed by: PHYSICAL MEDICINE & REHABILITATION

## 2019-04-15 PROCEDURE — 6370000000 HC RX 637 (ALT 250 FOR IP): Performed by: INTERNAL MEDICINE

## 2019-04-15 PROCEDURE — 97110 THERAPEUTIC EXERCISES: CPT | Performed by: PHYSICAL THERAPIST

## 2019-04-15 PROCEDURE — 97530 THERAPEUTIC ACTIVITIES: CPT | Performed by: PHYSICAL THERAPIST

## 2019-04-15 PROCEDURE — 36592 COLLECT BLOOD FROM PICC: CPT

## 2019-04-15 PROCEDURE — 80069 RENAL FUNCTION PANEL: CPT

## 2019-04-15 PROCEDURE — 1280000000 HC REHAB R&B

## 2019-04-15 PROCEDURE — 97116 GAIT TRAINING THERAPY: CPT | Performed by: PHYSICAL THERAPIST

## 2019-04-15 PROCEDURE — 85027 COMPLETE CBC AUTOMATED: CPT

## 2019-04-15 PROCEDURE — 97535 SELF CARE MNGMENT TRAINING: CPT

## 2019-04-15 PROCEDURE — 6370000000 HC RX 637 (ALT 250 FOR IP): Performed by: PHYSICAL MEDICINE & REHABILITATION

## 2019-04-15 PROCEDURE — 94760 N-INVAS EAR/PLS OXIMETRY 1: CPT

## 2019-04-15 PROCEDURE — 2580000003 HC RX 258: Performed by: PHYSICAL MEDICINE & REHABILITATION

## 2019-04-15 PROCEDURE — 94640 AIRWAY INHALATION TREATMENT: CPT

## 2019-04-15 RX ADMIN — VANCOMYCIN HYDROCHLORIDE 125 MG: 125 CAPSULE ORAL at 15:18

## 2019-04-15 RX ADMIN — VANCOMYCIN HYDROCHLORIDE 125 MG: 125 CAPSULE ORAL at 00:01

## 2019-04-15 RX ADMIN — Medication 100 MG: at 09:41

## 2019-04-15 RX ADMIN — TRAZODONE HYDROCHLORIDE 25 MG: 50 TABLET ORAL at 22:28

## 2019-04-15 RX ADMIN — ALLOPURINOL 200 MG: 100 TABLET ORAL at 09:41

## 2019-04-15 RX ADMIN — TORSEMIDE 50 MG: 20 TABLET ORAL at 09:41

## 2019-04-15 RX ADMIN — VANCOMYCIN HYDROCHLORIDE 125 MG: 125 CAPSULE ORAL at 22:28

## 2019-04-15 RX ADMIN — VANCOMYCIN HYDROCHLORIDE 125 MG: 125 CAPSULE ORAL at 05:02

## 2019-04-15 RX ADMIN — Medication 10 ML: at 09:44

## 2019-04-15 RX ADMIN — HYDROCORTISONE: 1 CREAM TOPICAL at 09:41

## 2019-04-15 RX ADMIN — VERAPAMIL HYDROCHLORIDE 240 MG: 240 TABLET, FILM COATED, EXTENDED RELEASE ORAL at 09:41

## 2019-04-15 RX ADMIN — HEPARIN SODIUM 5000 UNITS: 5000 INJECTION INTRAVENOUS; SUBCUTANEOUS at 15:18

## 2019-04-15 RX ADMIN — CEFEPIME 2 G: 2 INJECTION, POWDER, FOR SOLUTION INTRAVENOUS at 22:28

## 2019-04-15 RX ADMIN — MOMETASONE FUROATE AND FORMOTEROL FUMARATE DIHYDRATE 1 PUFF: 100; 5 AEROSOL RESPIRATORY (INHALATION) at 21:06

## 2019-04-15 RX ADMIN — HEPARIN SODIUM 5000 UNITS: 5000 INJECTION INTRAVENOUS; SUBCUTANEOUS at 22:28

## 2019-04-15 RX ADMIN — THERA TABS 1 TABLET: TAB at 09:41

## 2019-04-15 RX ADMIN — Medication 10 ML: at 22:27

## 2019-04-15 RX ADMIN — FOLIC ACID 1 MG: 1 TABLET ORAL at 09:41

## 2019-04-15 RX ADMIN — HYDROCORTISONE: 1 CREAM TOPICAL at 22:28

## 2019-04-15 RX ADMIN — HEPARIN SODIUM 5000 UNITS: 5000 INJECTION INTRAVENOUS; SUBCUTANEOUS at 05:02

## 2019-04-15 RX ADMIN — ASPIRIN 81 MG: 81 TABLET ORAL at 09:41

## 2019-04-15 RX ADMIN — MOMETASONE FUROATE AND FORMOTEROL FUMARATE DIHYDRATE 1 PUFF: 100; 5 AEROSOL RESPIRATORY (INHALATION) at 11:51

## 2019-04-15 ASSESSMENT — PAIN SCALES - GENERAL
PAINLEVEL_OUTOF10: 0

## 2019-04-15 NOTE — PROGRESS NOTES
RN notified Dr Raleigh Busby regarding rash on patient's bilateral thighs, upper arms, abdomen and back. MD possible thinks a reactions to IV antibiotics but patient thinks it is due to change in laundry detergent at home. MD reviewed medications. Patient is scheduled for hydrocortisone cream twice a day and feels this satisfies needs. RN will monitor.

## 2019-04-15 NOTE — DISCHARGE SUMMARY
Physical Therapy  Discharge Summary    Name:Phillip Zepeda  MTM:[id number]  :1945  Treatment Diagnosis: impaired functional mobility, unsteady gait  Diagnosis: metabolic encephalopathy with Left shoulder joint prosthetic infection    Restrictions/Precautions  Restrictions/Precautions: Weight Bearing       Upper Extremity Weight Bearing Restrictions  Left Upper Extremity Weight Bearing: Non Weight Bearing  Other: shoulder prosthesis removed   Position Activity Restriction  Other position/activity restrictions: contact plus precautions for C diff;  HD THS this week, but plan to transition to MWF - beginning 4/15. TDC right chest, PICC line right upper arm,  sling for left UE when up and moving per patient report     Goals:                  Short term goals  Time Frame for Short term goals: Patient will in 1 week:   Short term goal 1: bed mobility independently- met 4/15  Short term goal 2: transfers independently- met 4/15  Short term goal 3: ambulate 200' with no device indpendently on level surfaces- met 4/15  Short term goal 4: ascend/descend curb step with no device or rail and close SBA/CGA for steadying- met 4/15  Short term goal 5: ascend/descend 12 steps with 1 rails and supervision- met 4/15            Long term goals  Time Frame for Long term goals : STGs=LTGs    Pt. Met  5/ long term goals. Pt. Currently ambulates 200 feet with no device  and MI  Up/down 12 steps with rail and supervision  Up/down curb step with no device with SBA/CGA  Sit to/from stand with MI  Bed mobility with independence  Recommend home PT in order to ensure safe transition to home  Pt. Safe to return home with assistance from family. Provided pt. with HEP  Assessment: Patient has met all LTG and is okay for room independence. Patient NWB LUE with sling. Patient independent with transfers and ambulates community distances at independent level. patient moves quickly but no LOB.  Patient performed 12 steps with rail with supervision with step to pattern, curb step with close SBA. Patient needs occasional rest breaks do to SOB but O2 sats 95%. Patient to be seen this PM and will review safety with mobility and HEP. Patient to be D/C home tomorrow, 4/16, with home PT.       Electronically signed by Rivka High PT on 4/15/2019 at 4:44 PM

## 2019-04-15 NOTE — CARE COORDINATION
Call placed to dgtr, Cesar Gonzalez, at request of the patient víctor. She will be picking him up tomorrow and needs to pick him up promptly at 10 so she can get to work. She is concerned about a rash on his arm. LSW spoke to bedside RN, Fredy Agee who states Dr Sammi Varela is aware of it.   [facility]     Case Management   [phone]    4/17/2019  9:01 AM

## 2019-04-15 NOTE — PROGRESS NOTES
RN called Dialysis to check to assure patient will begetting dialysis today. Patient is on scheduled and will be here later this afternoon.

## 2019-04-15 NOTE — PROGRESS NOTES
Occupational Therapy  Facility/Department: [address] IP REHAB  Daily Treatment Note  NAME: Driss Sutton  : 1945  MRN: [id number]    Date of Service: 4/15/2019    Discharge Recommendations:  Home with Home health OT, S Level 1  OT Equipment Recommendations  Other: pt wants grab bars installed in shower    Assessment   Performance deficits / Impairments: Decreased endurance;Decreased high-level IADLs  Assessment: Pt has met all STGs w/ OT intervention. He completed self care with MI except to cover Picc and Port sites prior to bathing, needed extra time to complete d/t limited endurance. Pt completed all  Transfers and fxl mobility in room w/ MI, used GB R hand during toileting & shower t/f/bathing tasks. He was given Room IND w/o AD and will be returning home Tues 19 w/ HH services. Treatment Diagnosis: decreased ADL, balance, mobility, activity tolerance  Prognosis: Good  History:  Patient is a 44-year-old male with a history of left shoulder arthroplasty in Dec. 2016 that developed an infection post op, and he had incision and drainage and IV antibiotics for 6 weeks, and on oral Cipro for 6 months. He did well until 2018 when he developed left shoulder pain and swelling again, and was placed on oral antibiotic therapy with a repeat incision and drainage of a left shoulder abscess. He did well with oral antibiotics again until  when he developed pain and swelling again while taking po Cipro. In March, he had removal of his left total shoulder arthroplasty prosthesis. He was placed on IV antibiotics, and he is now taking vancomycin and cefepime. He also developed acute kidney injury and was placed on hemodialysis. He was started in therapies, but needs more therapy before discharged to home safely with his family. He is now admitted to our rehabilitation work on goals of improving his transfers, gait, balance, and self-care activities with no weightbearing status of the left arm.  He March, he had removal of his left total shoulder arthroplasty prosthesis. He was placed on IV antibiotics, and he is now taking vancomycin and cefepime. He also developed acute kidney injury and was placed on hemodialysis. He was started in therapies, but needs more therapy before discharged to home safely with his family. He is now admitted to our rehabilitation work on goals of improving his transfers, gait, balance, and self-care activities with no weightbearing status of the left arm. He continues on IV antibiotics and hemodialysis. We'll get nephrology consult. (copied per note Dr Joe Henry 4/10/19)  Response to previous treatment: Patient with no complaints from previous session  Family / Caregiver Present: Yes(wife and step daughter)  Referring Practitioner: [facility]  Diagnosis: metabolic encephalopathy  Subjective  Subjective: pt met bedside, agreeable to OT, contact precautions, no specific pain, just a little in my feet           Objective       Instrumental ADL's  Instrumental ADLs: Yes  Meal Prep  Meal Prep Level: Other  Meal Prep Level of Assistance: Independent  Meal Preparation: no device except LUE in sling. Able to gather items to prepare egg on stovetop. Safely used stovetop - one cue for caution as sling was sliding over handle of pan with risk of spilling contents. Anticipate pt will be safe to cook     Functional Mobility  Functional - Mobility Device: No device  Activity: Transport items; Retrieve items  Assist Level: Modified independent   Functional Mobility Comments: safely navigated department, around obstacles, without difficulty                          Vision  Vision Comment: wears glassess AATs, R eye vision \"distorted\" from 2 retinal tears  Cognition  Overall Cognitive Status: [facility]  Cognition Comment: pt states he feels memory is back to normal, good recall of biographical information                    Type of ROM/Therapeutic Exercise  Type of ROM/Therapeutic Exercise: Free weights  Comment: right UE - 2 lb dumbbell   Exercises  Shoulder Flexion: 10  Shoulder Extension: 10  Horizontal ABduction: 10  Horizontal ADduction: 10  Elbow Flexion: 10  Elbow Extension: 10  Supination: 10  Pronation: 10  Wrist Flexion: 10  Wrist Extension: 10                    Plan   Plan  Times per week: 5-6 days per week  Times per day: Twice a day  Plan weeks: wants DC tomorrow 4/16/19--recommend home OT services  Specific instructions for Next Treatment: kitchen tasks, home environment t/f  Current Treatment Recommendations: Functional Mobility Training, Patient/Caregiver Education & Training, Home Management Training, Endurance Training, Equipment Evaluation, Education, & procurement, Balance Training, Safety Education & Training, Self-Care / ADL          Goals  Short term goals  Time Frame for Short term goals: 1 week pt will.   Short term goal 1: bathe indep, set up to cover PICC and port--goal met 4/15/19  Short term goal 2: dress indep--goal met 4/15/19  Short term goal 3: toilet indep with grab bar - AD as needed--goal met 4/15/19  Short term goal 4: transfer indep without device, but UE support as needed--goal met 4/15/19  Short term goal 5: functional mobility indep, use of sling as AD--goal met 4/15/19  Long term goals  Time Frame for Long term goals : same as stg  Patient Goals   Patient goals : \"I want to be able to feel safe walking around, taking care of myself\"       Therapy Time   Individual Concurrent Group Co-treatment   Time In 1345         Time Out 1430         Minutes 45         Timed Code Treatment Minutes: NIRMALA MoyerR/L 770

## 2019-04-15 NOTE — FLOWSHEET NOTE
Treatment time: 4 hours  Net UF: 3600 ml     Pre weight: 92.4 kg  Post weight:89.5 kg  EDW: TBD kg     Access used: R TDC    Access function: Well with  ml/min     Medications or blood products given: n/a     Regular outpatient schedule: [address]     Summary of response to treatment: Well and without complications; patient's vital signs remained stable throughout entire treatment.      Copy of dialysis treatment record placed in chart, to be scanned into EMR.

## 2019-04-15 NOTE — DISCHARGE INSTR - COC
thought processes intact and able to concentrate and follow conversation    IV Access:  - None    Nursing Mobility/ADLs:  Walking   Independent  Transfer  Independent  Bathing  Independent  Dressing  Independent  [address]  Independent  Med Delivery   whole    Wound Care Documentation and Therapy:        Elimination:  Continence:   · Bowel: Yes  · Bladder: Yes  Urinary Catheter: None   Colostomy/Ileostomy/Ileal Conduit: No       Date of Last BM: 4/15    Intake/Output Summary (Last 24 hours) at 4/15/2019 1449  Last data filed at 4/15/2019 1318  Gross per 24 hour   Intake 1150 ml   Output --   Net 1150 ml     I/O last 3 completed shifts: In: 12 [P.O.:660; I.V.:10]  Out: -     Safety Concerns: At Risk for Falls    Impairments/Disabilities:      Vision    Nutrition Therapy:  Current Nutrition Therapy:   - Oral Diet:  General    Routes of Feeding: Oral  Liquids: Thin Liquids  Daily Fluid Restriction: no  Last Modified Barium Swallow with Video (Video Swallowing Test): not done    Treatments at the Time of Hospital Discharge:   Respiratory Treatments: Duonebs  Oxygen Therapy:  is not on home oxygen therapy. Ventilator:    - No ventilator support    Rehab Therapies: Physical Therapy and Occupational Therapy  Weight Bearing Status/Restrictions: No weight bearing restirctions  Other Medical Equipment (for information only, NOT a DME order):     Other Treatments:     Patient's personal belongings (please select all that are sent with patient):  Glasses    RN SIGNATURE:  Electronically signed by Nato Mccloud RN on 4/16/19 at 7:17 AM    CASE MANAGEMENT/SOCIAL WORK SECTION    Inpatient Status Date: 4-    Readmission Risk Assessment Score:  Readmission Risk              Risk of Unplanned Readmission:        13           Discharging to Facility/ Agency   · Name:     [address]  · Address:  · Phone:    [phone]  · Fax:        [phone]    Dialysis Facility (if applicable)   · Name:   [facility]  · Address:  · Dialysis Schedule:  MON, WED, Friday   CHAIR TIME IS 11:45 AM     · Phone:    [phone]  · Fax:        [phone]    / signature: [facility]     Case Management   [phone]    4/15/2019  2:50 PM      PHYSICIAN SECTION    Prognosis: Good    Condition at Discharge: Stable    Rehab Potential (if transferring to Rehab): Good    Recommended Labs or Other Treatments After Discharge: PT,OT,VN    Physician Certification: I certify the above information and transfer of Jules Phillips  is necessary for the continuing treatment of the diagnosis listed and that he requires [address] for less 30 days.      Update Admission H&P: No change in H&P    PHYSICIAN SIGNATURE:  Electronically signed by Jose Bruno MD on 4/16/19 at 7:48 AM

## 2019-04-15 NOTE — PROGRESS NOTES
Nephrology Attending Progress Note      SUBJECTIVE:  We are following this patient for PETRA. The patient was admitted to [address] [place] [address] last week with worsening edema, altered mental status and PETRA. Serologic work up has been negative. Initiated on HD 4/3/19 and shows no signs of recovery of kidney function yet. Interval History:  -No acute events overnight  -HD today to transition to MWF schedule      ROS: No new or active complaints  SHx: No family present at bedside      Scheduled Meds:   torsemide  50 mg Oral Daily    darbepoetin juan-polysorbate  40 mcg Subcutaneous Weekly    hydrocortisone   Topical BID    sodium chloride flush  10 mL Intravenous BID    aspirin  81 mg Oral Daily    multivitamin  1 tablet Oral Daily    folic acid  1 mg Oral Daily    thiamine  100 mg Oral Daily    vancomycin  125 mg Oral 4 times per day    verapamil  240 mg Oral Daily    heparin (porcine)  5,000 Units Subcutaneous 3 times per day    mometasone-formoterol  1 puff Inhalation BID    allopurinol  200 mg Oral Daily    cefepime  2 g Intravenous Once per day on  Sat    traZODone  25 mg Oral Nightly     Continuous Infusions:  PRN Meds:.heparin (porcine), sennosides-docusate sodium, alteplase, albuterol, acetaminophen, ondansetron    Physical Exam:    TEMPERATURE:  Current - Temp: 98.1 °F (36.7 °C); Max - Temp  Av °F (36.7 °C)  Min: 97.9 °F (36.6 °C)  Max: 98.1 °F (36.7 °C)  RESPIRATIONS RANGE: Resp  Av  Min: 18  Max: 18  PULSE RANGE: Pulse  Av.7  Min: 80  Max: 91  BLOOD PRESSURE RANGE:  Systolic (89NFZ), FSY:564 , Min:147 , JGW:933   ; Diastolic (14FCY), SJO:36, Min:69, Max:79    24HR INTAKE/OUTPUT:      Intake/Output Summary (Last 24 hours) at 4/15/2019 1255  Last data filed at 4/15/2019 0825  Gross per 24 hour   Intake 790 ml   Output --   Net 790 ml     Gen: NAD  HEENT: Sclera anicteric, MMM  Neck: Supple.  No JVD  CV: RRR, S1/S2, No R/M/G  Pulm: Mild expiratory wheezing  Abd: Soft, NT, ND, (+)BS  Ext: 1+ pitting edema in B/L LE  Neuro: Awake/Alert, Answers questions appropriately  Skin: Warm. No visible rashes appreciated  Access: R IJ TDC        LAB DATA:    CBC:   Lab Results   Component Value Date    WBC 5.6 04/15/2019    RBC 2.63 04/15/2019    HGB 8.6 04/15/2019    HCT 26.4 04/15/2019    .4 04/15/2019    MCH 32.6 04/15/2019    MCHC 32.5 04/15/2019    RDW 15.6 04/15/2019     04/15/2019    MPV 9.0 04/15/2019     BMP:    Lab Results   Component Value Date     04/15/2019    K 4.5 04/15/2019    K 4.3 04/11/2019     04/15/2019    CO2 24 04/15/2019    BUN 45 04/15/2019    CREATININE 6.5 04/15/2019    CALCIUM 8.9 04/15/2019    GFRAA 10 04/15/2019    LABGLOM 8 04/15/2019    GLUCOSE 91 04/15/2019     Ionized Calcium:  No results found for: IONCA  Magnesium:    Lab Results   Component Value Date    MG 1.80 04/11/2019       IMPRESSION/RECOMMENDATIONS:      Principal Problem:    Status post total shoulder arthroplasty, left  Resolved Problems:    * No resolved hospital problems. *      PETRA:  -Etiology likely due to ATN  -Started HD on 4/3/19  -No sign of recovery of kidney function  -HD today to transition to [facility] schedule  -Outpatient HD unit: [address] [place] [address] at 11:45 AM  -Last post-HD weight 89.5kg.  Set as new TW  -Started on torsemide 50mg PO daily  -Will need renal biopsy at some point if remains HD dependent (currently on ASA; need to be stopped for 1 week prior to and 2 weeks after biopsy)    Hematuria/Proteinuria:  -UPCR 1.2g  -GN work up negative to date  -Renal biopsy as above    HTN:  -Near goal  -Continue current regimen  -Challenge TW as above    Hyperkalemia:  -At goal  -Modulate with HD    Metabolic Acidosis:  -At goal  -Modulate with HD    Anemia of CKD:  -Below goal  -Would avoid IV iron in setting of active infection  -Started aranesp with HD on 4/13/19  -Transfusion threshold per primary team    CKD-MBD:  -At

## 2019-04-15 NOTE — PROGRESS NOTES
Patient admitted for Left Shoulder Arthroplast  on  April,  10, 2019 . Patient alert and oriented x 4 . VSS. Patient's last bowel movement noted on April , 15 , 2019. Patient transfers x 1 with walker. Patient aware of room and surroundings. Morning assessment complete. Patient educated on use of call light. Medication administered this morning with no complications. Patient current pain level status: denies, Interventions made as necessary: none needed  . Shannon Fall Score: 80. Robert Scale: 20.  Bed/Chair alarm on  and call light within reach. Input and output being monitored along with daily weights. Patient has no further needs at this time. RN will continue to monitor.

## 2019-04-15 NOTE — PROGRESS NOTES
Occupational Therapy  Facility/Department: [address] IP REHAB  Daily Treatment Note  NAME: Radha Canela  : 1945  MRN: [id number]    Date of Service: 4/15/2019    Discharge Recommendations:  Home with Home health OT, S Level 1  OT Equipment Recommendations  Other: pt wants grab bars installed in shower    Assessment   Performance deficits / Impairments: Decreased endurance;Decreased high-level IADLs  Assessment: Pt has met all STGs w/ OT intervention. He completed self care with MI except to cover Picc and Port sites prior to bathing, needed extra time to complete d/t limited endurance. Pt completed all  Transfers and fxl mobility in room w/ MI, used GB R hand during toileting & shower t/f/bathing tasks. He was given Room IND w/o AD and will be returning home Tues 19 w/ HH services. Treatment Diagnosis: decreased ADL, balance, mobility, activity tolerance  Prognosis: Good  History:  Patient is a 72-year-old male with a history of left shoulder arthroplasty in Dec. 2016 that developed an infection post op, and he had incision and drainage and IV antibiotics for 6 weeks, and on oral Cipro for 6 months. He did well until 2018 when he developed left shoulder pain and swelling again, and was placed on oral antibiotic therapy with a repeat incision and drainage of a left shoulder abscess. He did well with oral antibiotics again until  when he developed pain and swelling again while taking po Cipro. In March, he had removal of his left total shoulder arthroplasty prosthesis. He was placed on IV antibiotics, and he is now taking vancomycin and cefepime. He also developed acute kidney injury and was placed on hemodialysis. He was started in therapies, but needs more therapy before discharged to home safely with his family. He is now admitted to our rehabilitation work on goals of improving his transfers, gait, balance, and self-care activities with no weightbearing status of the left arm.  He continues on IV antibiotics and hemodialysis. We'll get nephrology consult. (copied per note Dr Wright Come 4/10/19)  Exam: set up for bathing, MI w/ dressing, grooming, toileting; completes transfers, functional mobility no AD, limited use of L UE & is NWB  Patient Education: educated on ECT/using rest breaks when fatigue  REQUIRES OT FOLLOW UP: Yes  Activity Tolerance  Activity Tolerance: Patient Tolerated treatment well  Activity Tolerance: pt pleasant & cooperative, able to follow commands, good awareness of L TSR precautions  Safety Devices  Safety Devices in place: Yes(given Room IND)  Type of devices: Call light within reach         Patient Diagnosis(es): There were no encounter diagnoses. has a past medical history of Asthma, Hypertension, and Sleep apnea. has a past surgical history that includes eye surgery; Appendectomy; shoulder surgery; and Colonoscopy (02/20/2017). Restrictions  Restrictions/Precautions  Restrictions/Precautions: Weight Bearing  Upper Extremity Weight Bearing Restrictions  Left Upper Extremity Weight Bearing: Non Weight Bearing  Other: shoulder prosthesis removed  Position Activity Restriction  Other position/activity restrictions: contact plus precautions for C diff;  HD THS this week, but plan to transition to MWF - beginning 4/15. TDC right chest, PICC line right upper arm,  sling for left UE when up and moving per patient report  Subjective   General  Chart Reviewed: Yes  Additional Pertinent Hx:  Patient is a 72-year-old male with a history of left shoulder arthroplasty in Dec. 2016 that developed an infection post op, and he had incision and drainage and IV antibiotics for 6 weeks, and on oral Cipro for 6 months. He did well until March 2018 when he developed left shoulder pain and swelling again, and was placed on oral antibiotic therapy with a repeat incision and drainage of a left shoulder abscess.  He did well with oral antibiotics again until January, 2019 when he developed pain and swelling again while taking po Cipro. In March, he had removal of his left total shoulder arthroplasty prosthesis. He was placed on IV antibiotics, and he is now taking vancomycin and cefepime. He also developed acute kidney injury and was placed on hemodialysis. He was started in therapies, but needs more therapy before discharged to home safely with his family. He is now admitted to our rehabilitation work on goals of improving his transfers, gait, balance, and self-care activities with no weightbearing status of the left arm. He continues on IV antibiotics and hemodialysis. We'll get nephrology consult.  (copied per note Dr Joi Real 4/10/19)  Response to previous treatment: Patient with no complaints from previous session  Family / Caregiver Present: Yes(wife and step daughter)  Referring Practitioner: [facility]  Diagnosis: metabolic encephalopathy  Subjective  Subjective: pt met bedside, agreeable to OT, contact precautions, no specific pain, just a little in my feet      Orientation  Orientation  Overall Orientation Status: Within Functional Limits  Objective    ADL  Feeding: Independent  Grooming: Independent  UE Bathing: Setup(OT covered R UE PICC and R chest port w/ tegaderm, pt able to manage water & [facility] while seated on shower chair)  LE Bathing: Modified independent (able to bend over to wash & dry LEs + feet; used GB to stand to wash, rinse & dry alli areas, no LOB)  UE Dressing: Independent  LE Dressing: Modified independent (safety concerns when trying to don socks, crosses legs and used B hands to assist , safety concerns in standing while managing clothing over hips; easily fatigues)  Toileting: Modified independent   Additional Comments: covered TDC and PICC line with waterproof dressing prioir to shower, sounds congested, pulmonary checked O2 level--96% pulse ox on RA        Balance  Sitting Balance: Independent  Standing Balance: Modified independent   Standing Balance  Time: 3-5 minutes  Activity: Static standing without device during ADLs and t/f  Sit to stand: Modified independent  Stand to sit: Modified independent  Comment: safety concerns d/t NWB L UE  Functional Mobility  Functional - Mobility Device: No device  Activity: Retrieve items;Transport items; To/from bathroom  Assist Level: Modified independent   Functional Mobility Comments: able to gather clothing, transport to bathroom w/o AD, safety concerns d/t NWB L UE  Toilet Transfers  Toilet - Technique: Ambulating  Equipment Used: Grab bars  Toilet Transfer: Modified independent  Toilet Transfers Comments: used R hand on GB only  Shower Transfers  Shower - Transfer From: Other  Shower - Transfer Type: To and From  Shower - Transfer To: Shower seat with back  Shower - Technique: Ambulating  Shower Transfers: Modified independence  Shower Transfers Comments: completed shower stall & shower chair t/f using GB R hand only, safety concerns  Wheelchair Bed Transfers  Wheelchair/Bed - Technique: Ambulating  Equipment Used:  Other  Level of Asssistance: Modified independent   Wheelchair Transfers Comments: MI w/ transfers in & out of recliner, no AD but safety concerns--given room IND no AD when NOT hooked to IV     Transfers  Sit to stand: Modified independent  Stand to sit: Modified independent  Transfer Comments: Transfer in & out of recliner chair and shower chair w/ MI, did not use AD, used GB w/ R UE only (is NWB L UE)                    Vision  Vision Comment: wears glassess AATs, 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  Cognition Comment: pt states he feels memory is back to normal, good recall of biographical information                                         Plan   Plan  Times per week: 5-6 days per week  Times per day: Twice a day  Plan weeks: wants DC tomorrow 4/16/19--recommend home OT services  Specific instructions for Next Treatment: kitchen tasks, home environment t/f  Current Treatment Recommendations: Functional Mobility Training, Patient/Caregiver Education & Training, Home Management Training, Endurance Training, Equipment Evaluation, Education, & procurement, Balance Training, Safety Education & Training, Self-Care / ADL    Goals  Short term goals  Time Frame for Short term goals: 1 week pt will.   Short term goal 1: bathe indep, set up to cover PICC and port--goal met 4/15/19  Short term goal 2: dress indep--goal met 4/15/19  Short term goal 3: toilet indep with grab bar - AD as needed--goal met 4/15/19  Short term goal 4: transfer indep without device, but UE support as needed--goal met 4/15/19  Short term goal 5: functional mobility indep, use of sling as AD--goal met 4/15/19  Long term goals  Time Frame for Long term goals : same as stg  Patient Goals   Patient goals : \"I want to be able to feel safe walking around, taking care of myself\"       Therapy Time   Individual Concurrent Group Co-treatment   Time In 1100         Time Out 1200         Minutes 60         Timed Code Treatment Minutes: [address], OTR/L #[id number]

## 2019-04-15 NOTE — PROGRESS NOTES
Occupational Therapy  Occupational Therapy  Discharge Summary     Name:Phillip Zepeda  TGV:[id number]  :1945  Treatment Diagnosis: impaired functional mobility, unsteady gait  Diagnosis: metabolic encephalopathy with Left shoulder joint prosthetic infection    Restrictions/Precautions  Restrictions/Precautions: Weight Bearing       Upper Extremity Weight Bearing Restrictions  Left Upper Extremity Weight Bearing: Non Weight Bearing  Other: shoulder prosthesis removed   Position Activity Restriction  Other position/activity restrictions: contact plus precautions for C diff;  HD THS this week, but plan to transition to MWF - beginning 4/15. TDC right chest, PICC line right upper arm,  sling for left UE when up and moving per patient report     Goals:   Short term goals  Time Frame for Short term goals: 1 week pt will. Short term goal 1: bathe indep, set up to cover PICC and port--goal met 4/15/19  Short term goal 2: dress indep--goal met 4/15/19  Short term goal 3: toilet indep with grab bar - AD as needed--goal met 4/15/19  Short term goal 4: transfer indep without device, but UE support as needed--goal met 4/15/19  Short term goal 5: functional mobility indep, use of sling as AD--goal met 4/15/19   Long term goals  Time Frame for Long term goals : same as stg    Pt.  Met 5/5 short term goals     Current Functional Status:   ADL  Feeding: Independent  Grooming: Independent  UE Bathing: Setup(OT covered R UE PICC and R chest port w/ tegaderm, pt able to manage water & [address] while seated on shower chair)  LE Bathing: Modified independent (able to bend over to wash & dry LEs + feet; used GB to stand to wash, rinse & dry alli areas, no LOB)  UE Dressing: Independent  LE Dressing: Modified independent (safety concerns when trying to don socks, crosses legs and used B hands to assist , safety concerns in standing while managing clothing over hips; easily fatigues)  Toileting: Modified independent   Additional Comments: covered TDC and PICC line with waterproof dressing prioir to shower, sounds congested, pulmonary checked O2 level--96% pulse ox on RA    Bed mobility  Bridging: Independent  Rolling to Left: Independent(slight roll due to shoulder)  Rolling to Right: Independent  Supine to Sit: Independent  Sit to Supine: Independent  Scooting: Independent  Comment: Flat, regualr bed    Functional Transfers: Toilet Transfers  Toilet - Technique: Ambulating  Equipment Used: Grab bars  Toilet Transfer: Modified independent  Toilet Transfers Comments: used R hand on GB only         Shower Transfers  Shower - Transfer From: Other  Shower - Transfer Type: To and From  Shower - Transfer To: Shower seat with back  Shower - Technique: Ambulating  Shower Transfers: Modified independence  Shower Transfers Comments: completed shower stall & shower chair t/f using GB R hand only, safety concerns           Functional Mobility  Functional - Mobility Device: No device  Activity: Transport items, Retrieve items  Assist Level: Modified independent   Functional Mobility Comments: safely navigated department, around obstacles, without difficulty    Instrumental ADL's  Instrumental ADLs: Yes  Meal Prep  Meal Prep Level: Other  Meal Prep Level of Assistance: Independent  Meal Preparation: no device except LUE in sling. Able to gather items to prepare egg on stovetop. Safely used stovetop - one cue for caution as sling was sliding over handle of pan with risk of spilling contents. Anticipate pt will be safe to cook                             UE Function:      LUE in sling, elbow and distal WFL, RUE WFL except weakness shoulder with TSR            Assessment:   Assessment: Pt has met all STGs w/ OT intervention. He completed self care with MI except to cover Picc and Port sites prior to bathing, needed extra time to complete d/t limited endurance.  Pt completed all  Transfers and fxl mobility in room w/ MI, used GB R hand during toileting & shower t/f/bathing tasks. He was given Room IND w/o AD and will be returning home Tues 4/16/19 w/ HH services.   Prognosis: Good     REQUIRES OT FOLLOW UP: Yes  Discharge Recommendations: Home with Home health OT, S Level 1    Equipment Recommendations:  Grab bars in shower           Electronically signed by Evans Wilcox OT on 4/15/2019 at 4:41 PM

## 2019-04-15 NOTE — PROGRESS NOTES
Department of Physical Medicine & Rehabilitation  Progress Note    Patient Identification:  Kareen Fuller  [id number]  : 1945  Admit date: 4/10/2019    Chief Complaint: Status post total shoulder arthroplasty, left    Subjective:   Pt seen this AM. Patient will be discussed this AM in [address] with entire Rehab Team of PT, OT, Speech, Dietary, Nursing, and Social Service about the progress made in Rehab Unit therapies, and when patient will be ready for discharge. We think patient will be ready for discharge to home in the next 1-2 days with home therapies of PT, OT, and visiting nurse. We'll need to determine when he finishes up with his antibiotics before discharge. He has progressed well in therapies, is now able to go up and down 12 steps walker over 175 feet with just supervision to standby assist.    .     ROS: No f/c, n/v, cp     Objective:  Patient Vitals for the past 24 hrs:   BP Temp Temp src Pulse Resp SpO2 Weight   04/15/19 0455 (!) 157/79 98.1 °F (36.7 °C) Oral 86 18 94 % 197 lb 8.5 oz (89.6 kg)   19 -- -- -- -- 18 94 % --   19 1623 (!) 147/70 97.9 °F (36.6 °C) Oral 80 18 93 % --   19 1047 -- -- -- -- -- 95 % --   19 0855 -- -- -- -- 18 95 % --     Const: Alert. No distress, pleasant. HEENT: Normocephalic, atraumatic. Normal sclera/conjunctiva. MMM. CV: Regular rate and rhythm. Resp: No respiratory distress. Abd: NABS+. Soft, nontender, nondistended   Ext: No edema. MSK: LUE in sling. Neuro: Alert, oriented, appropriately interactive. Psych: Cooperative, appropriate mood and affect    Laboratory data: Available via EMR.    Last 24 hour lab  Recent Results (from the past 24 hour(s))   Renal Function Panel    Collection Time: 04/15/19  5:10 AM   Result Value Ref Range    Sodium 141 136 - 145 mmol/L    Potassium 4.5 3.5 - 5.1 mmol/L    Chloride 102 99 - 110 mmol/L    CO2 24 21 - 32 mmol/L    Anion Gap 15 3 - 16    Glucose 91 70 - 99 mg/dL    BUN 45

## 2019-04-16 ENCOUNTER — APPOINTMENT (OUTPATIENT)
Dept: GENERAL RADIOLOGY | Age: 74
DRG: 091 | End: 2019-04-16
Attending: PHYSICAL MEDICINE & REHABILITATION
Payer: MEDICARE

## 2019-04-16 VITALS
HEART RATE: 91 BPM | BODY MASS INDEX: 29.03 KG/M2 | HEIGHT: 69 IN | WEIGHT: 195.99 LBS | DIASTOLIC BLOOD PRESSURE: 70 MMHG | TEMPERATURE: 97.9 F | SYSTOLIC BLOOD PRESSURE: 146 MMHG | RESPIRATION RATE: 15 BRPM | OXYGEN SATURATION: 91 %

## 2019-04-16 LAB
ALBUMIN SERPL-MCNC: 3.8 G/DL (ref 3.4–5)
ANION GAP SERPL CALCULATED.3IONS-SCNC: 15 MMOL/L (ref 3–16)
BUN BLDV-MCNC: 40 MG/DL (ref 7–20)
CALCIUM SERPL-MCNC: 9 MG/DL (ref 8.3–10.6)
CHLORIDE BLD-SCNC: 98 MMOL/L (ref 99–110)
CO2: 25 MMOL/L (ref 21–32)
CREAT SERPL-MCNC: 5.6 MG/DL (ref 0.8–1.3)
GFR AFRICAN AMERICAN: 12
GFR NON-AFRICAN AMERICAN: 10
GLUCOSE BLD-MCNC: 98 MG/DL (ref 70–99)
PHOSPHORUS: 5.1 MG/DL (ref 2.5–4.9)
POTASSIUM SERPL-SCNC: 4.4 MMOL/L (ref 3.5–5.1)
SODIUM BLD-SCNC: 138 MMOL/L (ref 136–145)

## 2019-04-16 PROCEDURE — 6370000000 HC RX 637 (ALT 250 FOR IP): Performed by: INTERNAL MEDICINE

## 2019-04-16 PROCEDURE — 6370000000 HC RX 637 (ALT 250 FOR IP): Performed by: PHYSICAL MEDICINE & REHABILITATION

## 2019-04-16 PROCEDURE — 80069 RENAL FUNCTION PANEL: CPT

## 2019-04-16 PROCEDURE — 94760 N-INVAS EAR/PLS OXIMETRY 1: CPT

## 2019-04-16 PROCEDURE — 6360000002 HC RX W HCPCS: Performed by: PHYSICAL MEDICINE & REHABILITATION

## 2019-04-16 PROCEDURE — 94640 AIRWAY INHALATION TREATMENT: CPT

## 2019-04-16 PROCEDURE — 36592 COLLECT BLOOD FROM PICC: CPT

## 2019-04-16 PROCEDURE — 71045 X-RAY EXAM CHEST 1 VIEW: CPT

## 2019-04-16 RX ORDER — DIAPER,BRIEF,INFANT-TODD,DISP
EACH MISCELLANEOUS
Qty: 1 TUBE | Refills: 1 | Status: SHIPPED | OUTPATIENT
Start: 2019-04-16 | End: 2019-04-23

## 2019-04-16 RX ORDER — VERAPAMIL HYDROCHLORIDE 240 MG/1
240 TABLET, FILM COATED, EXTENDED RELEASE ORAL DAILY
Qty: 30 TABLET | Refills: 3 | Status: SHIPPED | OUTPATIENT
Start: 2019-04-17

## 2019-04-16 RX ORDER — TRAZODONE HYDROCHLORIDE 50 MG/1
25 TABLET ORAL NIGHTLY
Qty: 30 TABLET | Refills: 2 | Status: SHIPPED | OUTPATIENT
Start: 2019-04-16 | End: 2021-06-22

## 2019-04-16 RX ORDER — TORSEMIDE 10 MG/1
50 TABLET ORAL DAILY
Qty: 150 TABLET | Refills: 3 | Status: SHIPPED | OUTPATIENT
Start: 2019-04-17 | End: 2021-06-22

## 2019-04-16 RX ADMIN — HYDROCORTISONE: 1 CREAM TOPICAL at 09:06

## 2019-04-16 RX ADMIN — VANCOMYCIN HYDROCHLORIDE 125 MG: 125 CAPSULE ORAL at 05:43

## 2019-04-16 RX ADMIN — Medication 100 MG: at 09:02

## 2019-04-16 RX ADMIN — FOLIC ACID 1 MG: 1 TABLET ORAL at 09:02

## 2019-04-16 RX ADMIN — VERAPAMIL HYDROCHLORIDE 240 MG: 240 TABLET, FILM COATED, EXTENDED RELEASE ORAL at 09:02

## 2019-04-16 RX ADMIN — VANCOMYCIN HYDROCHLORIDE 125 MG: 125 CAPSULE ORAL at 11:51

## 2019-04-16 RX ADMIN — HEPARIN SODIUM 5000 UNITS: 5000 INJECTION INTRAVENOUS; SUBCUTANEOUS at 05:43

## 2019-04-16 RX ADMIN — MOMETASONE FUROATE AND FORMOTEROL FUMARATE DIHYDRATE 1 PUFF: 100; 5 AEROSOL RESPIRATORY (INHALATION) at 09:35

## 2019-04-16 RX ADMIN — ASPIRIN 81 MG: 81 TABLET ORAL at 09:02

## 2019-04-16 RX ADMIN — ALLOPURINOL 200 MG: 100 TABLET ORAL at 09:02

## 2019-04-16 RX ADMIN — TORSEMIDE 50 MG: 20 TABLET ORAL at 09:02

## 2019-04-16 RX ADMIN — THERA TABS 1 TABLET: TAB at 09:03

## 2019-04-16 NOTE — PROGRESS NOTES
Pt discharged to home. Transportation here with wheelchair. Accompanied by family. Transported in personal vehicle. Discharge instructions, Rx, and personal belongings given to pt. Explanation of discharge medications and instructions understood by verbal statement. No questions, comments or concerns at this time.

## 2019-04-16 NOTE — PROGRESS NOTES
RN called Dr Jorge Matute regarding results to Xray, no acute abnormality. MD aware and okay discharge.

## 2019-04-16 NOTE — PROGRESS NOTES
Nephrology Attending Progress Note      SUBJECTIVE:  We are following this patient for PETRA. The patient was admitted to [address] [place] [address] last week with worsening edema, altered mental status and PETRA. Serologic work up has been negative. Initiated on HD 4/3/19 and shows no signs of recovery of kidney function yet. Interval History:  -S/p HD yesterday  -No acute events overnight  -DC today      ROS: No new or active complaints  SHx: No family present at bedside      Scheduled Meds:   cefepime  2 g Intravenous Once per day on     torsemide  50 mg Oral Daily    darbepoetin juan-polysorbate  40 mcg Subcutaneous Weekly    hydrocortisone   Topical BID    sodium chloride flush  10 mL Intravenous BID    aspirin  81 mg Oral Daily    multivitamin  1 tablet Oral Daily    folic acid  1 mg Oral Daily    thiamine  100 mg Oral Daily    vancomycin  125 mg Oral 4 times per day    verapamil  240 mg Oral Daily    heparin (porcine)  5,000 Units Subcutaneous 3 times per day    mometasone-formoterol  1 puff Inhalation BID    allopurinol  200 mg Oral Daily    traZODone  25 mg Oral Nightly     Continuous Infusions:  PRN Meds:.heparin (porcine), sennosides-docusate sodium, alteplase, albuterol, acetaminophen, ondansetron    Physical Exam:    TEMPERATURE:  Current - Temp: 97.9 °F (36.6 °C); Max - Temp  Av.9 °F (36.6 °C)  Min: 97.5 °F (36.4 °C)  Max: 98.2 °F (36.8 °C)  RESPIRATIONS RANGE: Resp  Av  Min: 15  Max: 20  PULSE RANGE: Pulse  Av  Min: 84  Max: 91  BLOOD PRESSURE RANGE:  Systolic (87VLG), JSZ:223 , Min:128 , HAS:973   ; Diastolic (40JCF), YJ, Min:70, Max:77    24HR INTAKE/OUTPUT:      Intake/Output Summary (Last 24 hours) at 2019 1237  Last data filed at 2019 0946  Gross per 24 hour   Intake 990 ml   Output --   Net 990 ml     Gen: NAD  HEENT: Sclera anicteric, MMM  Neck: Supple.  No JVD  CV: RRR, S1/S2, No R/M/G  Pulm: Mild expiratory wheezing  Abd: Soft, NT, ND, (+)BS  Ext: 1+ pitting edema in B/L LE  Neuro: Awake/Alert, Answers questions appropriately  Skin: Warm. No visible rashes appreciated  Access: R IJ TDC        LAB DATA:    CBC:   Lab Results   Component Value Date    WBC 5.6 04/15/2019    RBC 2.63 04/15/2019    HGB 8.6 04/15/2019    HCT 26.4 04/15/2019    .4 04/15/2019    MCH 32.6 04/15/2019    MCHC 32.5 04/15/2019    RDW 15.6 04/15/2019     04/15/2019    MPV 9.0 04/15/2019     BMP:    Lab Results   Component Value Date     04/16/2019    K 4.4 04/16/2019    K 4.3 04/11/2019    CL 98 04/16/2019    CO2 25 04/16/2019    BUN 40 04/16/2019    CREATININE 5.6 04/16/2019    CALCIUM 9.0 04/16/2019    GFRAA 12 04/16/2019    LABGLOM 10 04/16/2019    GLUCOSE 98 04/16/2019     Ionized Calcium:  No results found for: IONCA  Magnesium:    Lab Results   Component Value Date    MG 1.80 04/11/2019       IMPRESSION/RECOMMENDATIONS:      Principal Problem:    Status post total shoulder arthroplasty, left  Resolved Problems:    * No resolved hospital problems. *      PETRA:  -Etiology likely due to ATN  -Started HD on 4/3/19  -No sign of recovery of kidney function  -Outpatient HD unit: [address] [place] [address] at 11:45 AM  -Last post-HD weight 89.5kg.  Set as new TW  -DC on torsemide 50mg PO daily  -Will need renal biopsy at some point if remains HD dependent (currently on ASA; need to be stopped for 1 week prior to and 2 weeks after biopsy)    Hematuria/Proteinuria:  -UPCR 1.2g  -GN work up negative to date  -Renal biopsy as above    HTN:  -Near goal  -Continue current regimen  -Challenge TW as above    Hyperkalemia:  -At goal  -Modulate with HD    Metabolic Acidosis:  -At goal  -Modulate with HD    Anemia of CKD:  -Below goal  -Started aranesp with HD on 4/13/19  -FRANCISCA/venofer per outpatient protocol    CKD-MBD:  -At goal without Phos binders    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 Diff:  -On PO Vanco  -Management per Primary Team        Osiris Camarena MD, [facility]  04/16/19  [facility]

## 2019-04-16 NOTE — PROGRESS NOTES
Pt sleeping well so far this shift. Admitted with a left shoulder arthroplasty. Ambulates without a device. Is room independent in preparation for D/C tomorrow. Lungs diminished with some inspiratory wheezes on the right side. Family concerned about pt's cough and respiratory status, note left for MD to address. HR regular. Abdomen rounded with active bowel sounds. On HD MWF. Pt states he does produce urine. Is continent. Denies pain. PICC line to RUE flushes easily. Checked for needs when rounding, call light in reach at all times. Will monitor.  Emily Abel RN

## 2019-04-16 NOTE — PROGRESS NOTES
RN notified Dr Jorge Matute regarding family concerns, of Chest X ray and rash. MD aware and will address. RN notified of need for order to remove PICC line. Md will place order.

## 2019-04-16 NOTE — PROGRESS NOTES
Patient admitted for Left Shoulder Arthroplast  on  April,  10, 2019 . Patient alert and oriented x 4 .VSS.  Patient's last bowel movement noted on April , 15 , 2019. Patient transfers x 1 with walker.    Patient aware of room and surroundings. Morning assessment complete.  Patient educated on use of call light. Medication administered this morning with no complications. Patient current pain level status: denies, Interventions made as necessary: none needed. Patient has no further needs at this time. RN will continue to monitor.

## 2019-04-16 NOTE — PROGRESS NOTES
PICC Removal Note:  S: Order placed per MD for removal of PICC line. O: PICC line removed from right antecubital after sterile site prepped per protocol. PICC catheter tip visualized and intact, 39 cm in length. Pressure dressing applied with tegaderm tape. A: No redness, ecchymosis, edema, swelling, or drainage noted at site. P: Instructions provided on post PICC discharge care, including followup notification instructions.

## 2019-04-16 NOTE — PROGRESS NOTES
Department of Physical Medicine & Rehabilitation  Progress Note    Patient Identification:  Edith Cerna  [id number]  : 1945  Admit date: 4/10/2019    Chief Complaint: Status post total shoulder arthroplasty, left    Subjective:   Pt seen this AM. Patient was discussed yesterday in [place] [address] with entire Rehab Team, and we think patient will be ready for discharge to home  today with home therapies of PT, OT, and visiting nurse. He finished up with his IV antibiotics yesterday after his hemodialysis session. We will have him continue with the oral vancomycin for one more week. He has progressed well in therapies, is now able to go up and down 12 steps walker over 175 feet with just supervision to standby assist. His YISSEL and meds were filled out for his discharge today. He'll follow-up with nephrology in his hemodialysis sessions after discharge. He'll follow-up with his family doctor for his medical needs after discharge. .     ROS: No f/c, n/v, cp     Objective:  Patient Vitals for the past 24 hrs:   BP Temp Temp src Pulse Resp SpO2 Height Weight   19 0548 (!) 152/74 97.9 °F (36.6 °C) Oral 91 15 91 % -- 195 lb 15.8 oz (88.9 kg)   19 0219 -- -- -- -- 16 -- -- --   04/15/19 2225 128/77 97.9 °F (36.6 °C) Oral 85 16 93 % -- --   04/15/19 2111 -- -- -- -- -- 98 % -- --   04/15/19 1750 (!) 150/74 97.5 °F (36.4 °C) -- 84 18 96 % 5' 9\" (1.753 m) 203 lb 11.3 oz (92.4 kg)   04/15/19 1525 138/72 98.2 °F (36.8 °C) Oral 84 20 94 % -- --   04/15/19 1151 -- -- -- -- -- 96 % -- --   04/15/19 0941 (!) 156/69 -- -- 91 -- 93 % -- --     Const: Alert. No distress, pleasant. HEENT: Normocephalic, atraumatic. Normal sclera/conjunctiva. MMM. CV: Regular rate and rhythm. Resp: No respiratory distress. Abd: NABS+. Soft, nontender, nondistended   Ext: No edema. MSK: LUE in sling. Neuro: Alert, oriented, appropriately interactive.    Psych: Cooperative, appropriate mood and affect    Laboratory data: Available via EMR. Last 24 hour lab  Recent Results (from the past 24 hour(s))   CBC    Collection Time: 04/15/19 11:16 AM   Result Value Ref Range    WBC 5.6 4.0 - 11.0 K/uL    RBC 2.63 (L) 4.20 - 5.90 M/uL    Hemoglobin 8.6 (L) 13.5 - 17.5 g/dL    Hematocrit 26.4 (L) 40.5 - 52.5 %    .4 (H) 80.0 - 100.0 fL    MCH 32.6 26.0 - 34.0 pg    MCHC 32.5 31.0 - 36.0 g/dL    RDW 15.6 (H) 12.4 - 15.4 %    Platelets 949 377 - 328 K/uL    MPV 9.0 5.0 - 10.5 fL   Renal Function Panel    Collection Time: 04/16/19  5:52 AM   Result Value Ref Range    Sodium 138 136 - 145 mmol/L    Potassium 4.4 3.5 - 5.1 mmol/L    Chloride 98 (L) 99 - 110 mmol/L    CO2 25 21 - 32 mmol/L    Anion Gap 15 3 - 16    Glucose 98 70 - 99 mg/dL    BUN 40 (H) 7 - 20 mg/dL    CREATININE 5.6 (HH) 0.8 - 1.3 mg/dL    GFR Non-African American 10 (A) >60    GFR  12 (A) >60    Calcium 9.0 8.3 - 10.6 mg/dL    Phosphorus 5.1 (H) 2.5 - 4.9 mg/dL    Alb 3.8 3.4 - 5.0 g/dL       Therapy progress:  PT  Upper Extremity Weight Bearing Restrictions  Left Upper Extremity Weight Bearing: Non Weight Bearing  Other: shoulder prosthesis removed  Position Activity Restriction  Other position/activity restrictions: contact plus precautions for C diff;  HD THS this week, but plan to transition to MWF - beginning 4/15. TDC right chest, PICC line right upper arm,  sling for left UE when up and moving per patient report  Objective     Sit to Stand: Modified independent  Stand to sit: Modified independent  Bed to Chair: Modified independent  Device: No Device  Assistance: Modified Independent  Distance: 200', short distances  OT  PT Equipment Recommendations  Other: currently using no assistive device  Toilet - Technique: Ambulating  Equipment Used: Grab bars  Toilet Transfers Comments: used R hand on GB only  Assessment              Body mass index is 28.94 kg/m².        Assessment and Plan: Discharge to home today with home therapies and continued hemodialysis as an outpatient. Infection of Left total shoulder joint replacement - s/p removal of prosthesis. Continue cefepime until 4/16 per ID at [facility]. PT/OT. Acute kidney injury - HD TTS with plan to switch to MWF next week. Likely ATN, nephrology planning biopsy.      Hypertension - calan sr     Asthma history - Dulera     Sleep apnea history     Gout - allopurinol     Bowels: Change regimen to prn      Bladder: Check PVR x 3. [address] if PVR > 300ml.      Pain: Tylenol is ordered prn. ELOS: D/C in 1-2 days. Olu Frye.  MD Harmony 4/16/2019, 7:44 AM

## 2019-04-16 NOTE — DISCHARGE SUMMARY
Mobility: Bed, Chair, Wheel Chair: 7 - Patient independently transfers  Walk: 6 - Modified [place]  Walks at least 150 feet with an ambulatory device, orthosis or prosthesis OR requires extra amount of time OR there is concern for safety  Distance Walked: 200  Stairs: 5- Supervision Requires supervision(e.g., standing by, cuing, or coaxing) to go up and down one flight of stairs, PT Equipment Recommendations  Other: currently using no assistive device, Assessment: Patient has met all LTG and is okay for room independence. Patient NWB LUE with sling. Patient independent with transfers and ambulates community distances at independent level. patient moves quickly but no LOB. Patient performed 12 steps with rail with supervision with step to pattern, curb step with close SBA. Patient needs occasional rest breaks do to SOB but O2 sats 95%. Patient to be seen this PM and will review safety with mobility and HEP. Patient to be D/C home tomorrow, , with home PT. Occupational therapy: Eatin - Patient feeds self  Groomin - Patient independent with all grooming tasks  Bathin - Able to bathe all 10 areas with setup/sup/cues  Dressing-Upper: 7 - Patient independently dresses upper body  Dressing-Lower: 6 - Independent with device/prosthesis  Toiletin - Patient independent with all 3 tasks  Toilet Transfer: 7 -Patient independent on and off toilet/BSC  Shower Transfer: 6 - Modified independence,  , Assessment: Pt has met all STGs w/ OT intervention. He completed self care with MI except to cover Picc and Port sites prior to bathing, needed extra time to complete d/t limited endurance. Pt completed all  Transfers and fxl mobility in room w/ MI, used GB R hand during toileting & shower t/f/bathing tasks. He was given Room IND w/o AD and will be returning home Tu19 w/  services.     Speech therapy:  Comprehension: 7 - Patient understands complex ideas (math/planning)  Expression: 7 - Patient expresses complex ideas/needs  Social Interaction: 7 - Patient has appropriate behavior/relations 100% of the time  Problem Solvin - Independent with device (e.g. notes, schedules)  Memory: 6 - Patient requires device to recall (e.g. memory book)      Inpatient Rehabilitation Course:   Summer Vazquez is a 72 y.o. male admitted to inpatient rehabilitation on 4/10/2019 for s/p Status post total shoulder arthroplasty, left. The patient participated in an aggressive multidisciplinary inpatient rehabilitation program involving 3 hours per day, 5 days per week of rehabilitation. Patient is a 72-year-old male with a history of left shoulder arthroplasty in Dec. 2016 that developed an infection post op, and he had incision and drainage and IV antibiotics for 6 weeks, and on oral Cipro for 6 months. He did well until 2018 when he developed left shoulder pain and swelling again, and was placed on oral antibiotic therapy with a repeat incision and drainage of a left shoulder abscess. He did well with oral antibiotics again until  when he developed pain and swelling again while taking po Cipro. In March, he had removal of his left total shoulder arthroplasty prosthesis. He was placed on IV antibiotics, and he is now taking vancomycin and cefepime. He also developed acute kidney injury and was placed on hemodialysis. He was started in therapies, but needs more therapy before discharged to home safely with his family. He is now admitted to our rehabilitation work on goals of improving his transfers, gait, balance, and self-care activities with no weightbearing status of the left arm. He continues on IV antibiotics and hemodialysis. After admission to the Rehab Unit, he made steady progress in his therapies as listed above under each therapy section.  His upper and lower extremity coordination and strength did improve in therapy, and he was starting to improve with his endurance and functional status as he participated in his rehab therapies. His balance, labs and blood pressure were monitored while on the rehabilitation unit. Patient was discussed yesterday in [place] [id number] with entire Rehab Team, and we think patient will be ready for discharge to home  today with home therapies of PT, OT, and visiting nurse. He finished up with his IV antibiotics yesterday after his hemodialysis session. We will have him continue with the oral vancomycin for one more week. He has progressed well in therapies, is now able to go up and down 12 steps and walk with a walker over 175 feet with just supervision to standby assist. His YISSEL and meds were filled out for his discharge today. He'll follow-up with nephrology in his hemodialysis sessions after discharge. He'll follow-up with his family doctor for his medical needs after discharge.        Condition at Discharge: Good    Significant Diagnostics:   Lab Results   Component Value Date     04/16/2019    K 4.4 04/16/2019    CL 98 (L) 04/16/2019    BUN 40 (H) 04/16/2019    CREATININE 5.6 (HH) 04/16/2019    GLUCOSE 98 04/16/2019    CALCIUM 9.0 04/16/2019     Lab Results   Component Value Date    WBC 5.6 04/15/2019    RBC 2.63 (L) 04/15/2019    HGB 8.6 (L) 04/15/2019    HCT 26.4 (L) 04/15/2019    .4 (H) 04/15/2019    MCH 32.6 04/15/2019    MCHC 32.5 04/15/2019    RDW 15.6 (H) 04/15/2019     04/15/2019    MPV 9.0 04/15/2019     No results found for: PROTIME, INR  No results found for: APTT  No results found for: Sharon [place], SPECGRAV, BLOODU, PHUR, PROTEINU, UROBILINOGEN, NITRU, LEUKOCYTESUR, LABMICR, URRFLXCULT, URINETYPE  No results found for: MUCUS, [address], EPIU, BACTERIA  No results found for: Deramparo Meier    Xr Chest Portable    Result Date: 4/12/2019  EXAMINATION: SINGLE XRAY VIEW OF THE CHEST 4/12/2019 2:07 pm COMPARISON: 09/16/2018 HISTORY: ORDERING SYSTEM PROVIDED HISTORY: verifying placement of PICC TECHNOLOGIST PROVIDED HISTORY: Reason for exam:->verifying placement of PICC FINDINGS: The right PICC terminates in the right brachiocephalic vein. There is bibasilar atelectasis. The cardiac silhouette is stable. There is no pneumothorax or pleural effusion. The right internal jugular catheter terminates in the distal SVC. Status post left shoulder arthroplasty. 1. Right PICC terminating in the right brachiocephalic vein should be advanced approximately 4 cm. Patient Instructions: Follow-up visits: He'll follow-up with nephrology in his hemodialysis sessions after discharge. He'll follow-up with his family doctor for his medical needs after discharge. Discharge Medications:     Medication List      START taking these medications    hydrocortisone 1 % cream  Apply topically 2 times daily.      torsemide 10 MG tablet  Commonly known as:  DEMADEX  Take 5 tablets by mouth daily  Start taking on:  4/17/2019     verapamil 240 MG extended release tablet  Commonly known as:  CALAN SR  Take 1 tablet by mouth daily  Start taking on:  4/17/2019        CONTINUE taking these medications    acetaminophen 500 MG tablet  Commonly known as:  TYLENOL     albuterol sulfate  (90 Base) MCG/ACT inhaler     allopurinol 100 MG tablet  Commonly known as:  ZYLOPRIM     aspirin 81 MG tablet     folic acid 1 MG tablet  Commonly known as:  FOLVITE     mometasone-formoterol 100-5 MCG/ACT inhaler  Commonly known as:  DULERA     MULTIVITAMIN ADULT Tabs     traZODone 50 MG tablet  Commonly known as:  DESYREL     vancomycin 50 mg/mL oral solution  Commonly known as:  VANCOCIN  Take 2.5 mLs by mouth every 6 hours for 7 days     vitamin B-1 100 MG tablet  Commonly known as:  THIAMINE           Where to Get Your Medications      You can get these medications from any pharmacy    Bring a paper prescription for each of these medications  · hydrocortisone 1 % cream  · torsemide 10 MG tablet  · vancomycin 50 mg/mL oral solution  · verapamil 240 MG extended release tablet            I spent over 35 minutes on this discharge encounter between counseling, coordination of care, and medication reconciliation.         To comply with [facility] catalina SANCHEZ.[id number]:   Discharge order placed in advance to facilitate patients discharge needs      Willi Serrano MD, 4/16/2019, 9:22 AM

## 2020-12-18 ENCOUNTER — OFFICE VISIT (OUTPATIENT)
Dept: PRIMARY CARE CLINIC | Age: 75
End: 2020-12-18
Payer: MEDICARE

## 2020-12-18 LAB — SARS-COV-2: NOT DETECTED

## 2020-12-18 PROCEDURE — 99211 OFF/OP EST MAY X REQ PHY/QHP: CPT | Performed by: NURSE PRACTITIONER

## 2020-12-18 PROCEDURE — G8417 CALC BMI ABV UP PARAM F/U: HCPCS | Performed by: NURSE PRACTITIONER

## 2020-12-18 PROCEDURE — G8428 CUR MEDS NOT DOCUMENT: HCPCS | Performed by: NURSE PRACTITIONER

## 2020-12-28 ENCOUNTER — OFFICE VISIT (OUTPATIENT)
Dept: PRIMARY CARE CLINIC | Age: 75
End: 2020-12-28
Payer: MEDICARE

## 2020-12-28 LAB — SARS-COV-2, NAA: NOT DETECTED

## 2020-12-28 PROCEDURE — G8417 CALC BMI ABV UP PARAM F/U: HCPCS | Performed by: NURSE PRACTITIONER

## 2020-12-28 PROCEDURE — G8428 CUR MEDS NOT DOCUMENT: HCPCS | Performed by: NURSE PRACTITIONER

## 2020-12-28 PROCEDURE — 99211 OFF/OP EST MAY X REQ PHY/QHP: CPT | Performed by: NURSE PRACTITIONER

## 2020-12-30 ENCOUNTER — ANESTHESIA EVENT (OUTPATIENT)
Dept: ENDOSCOPY | Age: 75
End: 2020-12-30
Payer: MEDICARE

## 2020-12-30 RX ORDER — GABAPENTIN 300 MG/1
300 CAPSULE ORAL EVERY EVENING
COMMUNITY

## 2020-12-30 NOTE — PROGRESS NOTES
Phone message left on home, pt`s cell and other # just rings, to call PAT dept at [phone]  for history review and surgery instructions.

## 2020-12-31 ENCOUNTER — HOSPITAL ENCOUNTER (OUTPATIENT)
Age: 75
Setting detail: OUTPATIENT SURGERY
Discharge: HOME | End: 2020-12-31
Attending: INTERNAL MEDICINE | Admitting: INTERNAL MEDICINE
Payer: MEDICARE

## 2020-12-31 ENCOUNTER — ANESTHESIA (OUTPATIENT)
Dept: ENDOSCOPY | Age: 75
End: 2020-12-31
Payer: MEDICARE

## 2020-12-31 VITALS
HEART RATE: 97 BPM | OXYGEN SATURATION: 97 % | BODY MASS INDEX: 28.11 KG/M2 | TEMPERATURE: 97.2 F | SYSTOLIC BLOOD PRESSURE: 148 MMHG | HEIGHT: 69 IN | DIASTOLIC BLOOD PRESSURE: 87 MMHG | RESPIRATION RATE: 20 BRPM | WEIGHT: 189.82 LBS

## 2020-12-31 VITALS
SYSTOLIC BLOOD PRESSURE: 138 MMHG | OXYGEN SATURATION: 97 % | DIASTOLIC BLOOD PRESSURE: 70 MMHG | RESPIRATION RATE: 1 BRPM

## 2020-12-31 DIAGNOSIS — D64.9 ANEMIA, UNSPECIFIED TYPE: ICD-10-CM

## 2020-12-31 PROCEDURE — 7100000011 HC PHASE II RECOVERY - ADDTL 15 MIN: Performed by: INTERNAL MEDICINE

## 2020-12-31 PROCEDURE — 2709999900 HC NON-CHARGEABLE SUPPLY: Performed by: INTERNAL MEDICINE

## 2020-12-31 PROCEDURE — 7100000010 HC PHASE II RECOVERY - FIRST 15 MIN: Performed by: INTERNAL MEDICINE

## 2020-12-31 PROCEDURE — 88305 TISSUE EXAM BY PATHOLOGIST: CPT

## 2020-12-31 PROCEDURE — 3700000001 HC ADD 15 MINUTES (ANESTHESIA): Performed by: INTERNAL MEDICINE

## 2020-12-31 PROCEDURE — 2580000003 HC RX 258: Performed by: ANESTHESIOLOGY

## 2020-12-31 PROCEDURE — 3609010300 HC COLONOSCOPY W/BIOPSY SINGLE/MULTIPLE: Performed by: INTERNAL MEDICINE

## 2020-12-31 PROCEDURE — 6360000002 HC RX W HCPCS: Performed by: NURSE ANESTHETIST, CERTIFIED REGISTERED

## 2020-12-31 PROCEDURE — 3700000000 HC ANESTHESIA ATTENDED CARE: Performed by: INTERNAL MEDICINE

## 2020-12-31 PROCEDURE — 7100000001 HC PACU RECOVERY - ADDTL 15 MIN: Performed by: INTERNAL MEDICINE

## 2020-12-31 PROCEDURE — 7100000000 HC PACU RECOVERY - FIRST 15 MIN: Performed by: INTERNAL MEDICINE

## 2020-12-31 RX ORDER — ONDANSETRON 2 MG/ML
4 INJECTION INTRAMUSCULAR; INTRAVENOUS
Status: DISCONTINUED | OUTPATIENT
Start: 2020-12-31 | End: 2020-12-31 | Stop reason: HOSPADM

## 2020-12-31 RX ORDER — SODIUM CHLORIDE 9 MG/ML
INJECTION, SOLUTION INTRAVENOUS CONTINUOUS
Status: DISCONTINUED | OUTPATIENT
Start: 2020-12-31 | End: 2020-12-31 | Stop reason: HOSPADM

## 2020-12-31 RX ORDER — LABETALOL HYDROCHLORIDE 5 MG/ML
5 INJECTION, SOLUTION INTRAVENOUS EVERY 10 MIN PRN
Status: DISCONTINUED | OUTPATIENT
Start: 2020-12-31 | End: 2020-12-31 | Stop reason: HOSPADM

## 2020-12-31 RX ORDER — SODIUM CHLORIDE 0.9 % (FLUSH) 0.9 %
10 SYRINGE (ML) INJECTION PRN
Status: DISCONTINUED | OUTPATIENT
Start: 2020-12-31 | End: 2020-12-31 | Stop reason: HOSPADM

## 2020-12-31 RX ORDER — PROMETHAZINE HYDROCHLORIDE 25 MG/ML
6.25 INJECTION, SOLUTION INTRAMUSCULAR; INTRAVENOUS
Status: DISCONTINUED | OUTPATIENT
Start: 2020-12-31 | End: 2020-12-31 | Stop reason: HOSPADM

## 2020-12-31 RX ORDER — PROPOFOL 10 MG/ML
INJECTION, EMULSION INTRAVENOUS CONTINUOUS PRN
Status: DISCONTINUED | OUTPATIENT
Start: 2020-12-31 | End: 2020-12-31 | Stop reason: SDUPTHER

## 2020-12-31 RX ORDER — SODIUM CHLORIDE 0.9 % (FLUSH) 0.9 %
10 SYRINGE (ML) INJECTION EVERY 12 HOURS SCHEDULED
Status: DISCONTINUED | OUTPATIENT
Start: 2020-12-31 | End: 2020-12-31 | Stop reason: HOSPADM

## 2020-12-31 RX ORDER — PROPOFOL 10 MG/ML
INJECTION, EMULSION INTRAVENOUS PRN
Status: DISCONTINUED | OUTPATIENT
Start: 2020-12-31 | End: 2020-12-31 | Stop reason: SDUPTHER

## 2020-12-31 RX ADMIN — SODIUM CHLORIDE: 9 INJECTION, SOLUTION INTRAVENOUS at 09:50

## 2020-12-31 RX ADMIN — PROPOFOL 160 MCG/KG/MIN: 10 INJECTION, EMULSION INTRAVENOUS at 10:41

## 2020-12-31 RX ADMIN — PROPOFOL 100 MG: 10 INJECTION, EMULSION INTRAVENOUS at 10:41

## 2020-12-31 ASSESSMENT — PAIN - FUNCTIONAL ASSESSMENT: PAIN_FUNCTIONAL_ASSESSMENT: 0-10

## 2020-12-31 ASSESSMENT — PULMONARY FUNCTION TESTS
PIF_VALUE: 0
PIF_VALUE: 1
PIF_VALUE: 1
PIF_VALUE: 0
PIF_VALUE: 1
PIF_VALUE: 0
PIF_VALUE: 1

## 2020-12-31 NOTE — PROGRESS NOTES
Assisted up to chair. Discharge instructions given to patient and family member. Verbalize understanding.

## 2020-12-31 NOTE — H&P
[facility]   Pre-operative History and Physical    Patient: Martha Louis  : 1945  Acct#: [de-identified]    History Obtained From: electronic medical record    HISTORY OF PRESENT ILLNESS  Procedure:Colonoscopy  Indications:surveillance  Past Medical History:        Diagnosis Date    Arthritis     Asthma     Hypertension     Kidney dysfunction 2019    after long term antibiotic and shoulder replacement infection removal    Neuropathy of both feet     Neuropathy of hand, left     Neuropathy of hand, right     Sleep apnea     USESE CPAP     Past Surgical History:        Procedure Laterality Date    APPENDECTOMY      COLONOSCOPY  2017    Dr Kia Herrera, polyp     EYE SURGERY      JOINT REPLACEMENT Left     shoulder    OTHER SURGICAL HISTORY      left shoulder replacement removal    SHOULDER SURGERY       Medications prior to admission:   Prior to Admission medications    Medication Sig Start Date End Date Taking? Authorizing Provider   gabapentin (NEURONTIN) 300 MG capsule Take 300 mg by mouth every evening.    Yes Historical Provider, MD   potassium chloride (KLOR-CON) 20 MEQ packet Take 20 mEq by mouth 2 times daily   Yes Historical Provider, MD   verapamil (CALAN SR) 240 MG extended release tablet Take 1 tablet by mouth daily 19  Yes Hitesh Antunez MD   torsemide (DEMADEX) 10 MG tablet Take 5 tablets by mouth daily  Patient taking differently: Take 5 mg by mouth daily  19  Yes Flory Mckenna MD   traZODone (DESYREL) 50 MG tablet Take 0.5 tablets by mouth nightly 19  Yes Hitesh Antunez MD   vitamin B-1 (THIAMINE) 100 MG tablet Take 100 mg by mouth daily   Yes Historical Provider, MD   acetaminophen (TYLENOL) 500 MG tablet Take 1,000 mg by mouth every 6 hours as needed    Yes Historical Provider, MD   albuterol sulfate  (90 Base) MCG/ACT inhaler Inhale 2 puffs into the lungs every 6 hours as needed for Wheezing   Yes Historical Provider, MD   Multiple Vitamins-Minerals (MULTIVITAMIN ADULT) TABS Take 1 tablet by mouth daily   Yes Historical Provider, MD   allopurinol (ZYLOPRIM) 100 MG tablet Take 200 mg by mouth daily    Yes Historical Provider, MD   ferrous sulfate (FE TABS 325) 325 (65 Fe) MG EC tablet Take 325 mg by mouth 3 times daily (with meals) Takes 4x a day    Historical Provider, MD     Allergies:   No known allergies    Social History     Socioeconomic History    Marital status:      Spouse name: Not on file    Number of children: Not on file    Years of education: Not on file    Highest education level: Not on file   Occupational History    Not on file   Social Needs    Financial resource strain: Not on file    Food insecurity     Worry: Not on file     Inability: Not on file   [employer] needs     Medical: Not on file     Non-medical: Not on file   Tobacco Use    Smoking status: Former Smoker     Packs/day: 1.00     Years: 40.00     Pack years: 40.00     Quit date:      Years since quittin.0    Smokeless tobacco: Never Used   Substance and Sexual Activity    Alcohol use:  Yes     Alcohol/week: 0.0 standard drinks     Comment: 1 glass of wine daily    Drug use: No    Sexual activity: Yes     Partners: Female   Lifestyle    Physical activity     Days per week: Not on file     Minutes per session: Not on file    Stress: Not on file   Relationships    Social connections     Talks on phone: Not on file     Gets together: Not on file     Attends Advent service: Not on file     Active member of club or organization: Not on file     Attends meetings of clubs or organizations: Not on file     Relationship status: Not on file    Intimate partner violence     Fear of current or ex partner: Not on file     Emotionally abused: Not on file     Physically abused: Not on file     Forced sexual activity: Not on file   Other Topics Concern    Not on file   Social History Narrative    Not on file     Family History   Problem Relation Age of Onset  COPD Mother     Heart Disease Father          PHYSICAL EXAM:      BP (!) 159/87   Pulse 96   Temp 97.1 °F (36.2 °C) (Temporal)   Resp 18   Ht 5' 9\" (1.753 m)   Wt 189 lb 13.1 oz (86.1 kg)   BMI 28.03 kg/m²  I        Heart:normal    Lungs: normal    Abdomen: normal      ASA Grade:  See anesthesia note      ASSESSMENT AND PLAN:    1. Procedure options, risks and benefits reviewed with patient and expresses understanding.

## 2020-12-31 NOTE — ANESTHESIA PRE PROCEDURE
[facility] Department of Anesthesiology  Pre-Anesthesia Evaluation/Consultation       Name:  Baltazar Okeefe  : 1945  Age:  70 y.o. MRN:  [id number]  Date: 2020           Surgeon: Surgeon(s):  Trini Agee MD    Procedure: Procedure(s):  COLONOSCOPY     Allergies   Allergen Reactions    No Known Allergies      Patient Active Problem List   Diagnosis    Status post total shoulder arthroplasty, left     Past Medical History:   Diagnosis Date    Arthritis     Asthma     Hypertension     Kidney dysfunction 2019    after long term antibiotic and shoulder replacement infection removal    Neuropathy of both feet     Neuropathy of hand, left     Neuropathy of hand, right     Sleep apnea     USESE CPAP     Past Surgical History:   Procedure Laterality Date    APPENDECTOMY      COLONOSCOPY  2017    Dr Anand Adamson, polyp     EYE SURGERY      JOINT REPLACEMENT Left     shoulder    OTHER SURGICAL HISTORY      left shoulder replacement removal    SHOULDER SURGERY       Social History     Tobacco Use    Smoking status: Former Smoker     Packs/day: 1.00     Years: 40.00     Pack years: 40.00     Quit date:      Years since quittin.0    Smokeless tobacco: Never Used   Substance Use Topics    Alcohol use: Yes     Alcohol/week: 0.0 standard drinks     Comment: 1 glass of wine daily    Drug use: No     Medications  No current facility-administered medications on file prior to encounter. Current Outpatient Medications on File Prior to Encounter   Medication Sig Dispense Refill    gabapentin (NEURONTIN) 300 MG capsule Take 300 mg by mouth every evening.       potassium chloride (KLOR-CON) 20 MEQ packet Take 20 mEq by mouth 2 times daily      verapamil (CALAN SR) 240 MG extended release tablet Take 1 tablet by mouth daily 30 tablet 3  torsemide (DEMADEX) 10 MG tablet Take 5 tablets by mouth daily (Patient taking differently: Take 5 mg by mouth daily ) 150 tablet 3    traZODone (DESYREL) 50 MG tablet Take 0.5 tablets by mouth nightly 30 tablet 2    vitamin B-1 (THIAMINE) 100 MG tablet Take 100 mg by mouth daily      acetaminophen (TYLENOL) 500 MG tablet Take 1,000 mg by mouth every 6 hours as needed       albuterol sulfate  (90 Base) MCG/ACT inhaler Inhale 2 puffs into the lungs every 6 hours as needed for Wheezing      Multiple Vitamins-Minerals (MULTIVITAMIN ADULT) TABS Take 1 tablet by mouth daily      allopurinol (ZYLOPRIM) 100 MG tablet Take 200 mg by mouth daily       ferrous sulfate (FE TABS 325) 325 (65 Fe) MG EC tablet Take 325 mg by mouth 3 times daily (with meals) Takes 4x a day       Current Facility-Administered Medications   Medication Dose Route Frequency Provider Last Rate Last Admin    0.9 % sodium chloride infusion   Intravenous Continuous Sourav Mulligan MD 75 mL/hr at 20 0950 New Bag at 20 0950    sodium chloride flush 0.9 % injection 10 mL  10 mL Intravenous 2 times per day Sourav Mulligan MD        sodium chloride flush 0.9 % injection 10 mL  10 mL Intravenous PRN Sourav Mulligan MD         Vital Signs (Current)   Vitals:    20 0940   BP: (!) 159/87   Pulse: 96   Resp: 18   Temp: 97.1 °F (36.2 °C)     Vital Signs Statistics (for past 48 hrs)     Temp  Av.1 °F (36.2 °C)  Min: 97.1 °F (36.2 °C)   Min taken time: 20 0940  Max: 97.1 °F (36.2 °C)   Max taken time: 20 0940  Pulse  Av  Min: 80   Min taken time: 20 0940  Max: 80   Max taken time: 20 0940  Resp  Av  Min: 25   Min taken time: 20 0940  Max: 25   Max taken time: 20 0940  BP  Min: 159/87   Min taken time: 20 0940  Max: 159/87   Max taken time: 12/31/20 0940    BP Readings from Last 3 Encounters:   20 (!) 159/87   19 (!) 146/70   17 (!) 165/93     BMI Body mass index is 28.03 kg/m². Estimated body mass index is 28.03 kg/m² as calculated from the following:    Height as of this encounter: 5' 9\" (1.753 m). Weight as of this encounter: 189 lb 13.1 oz (86.1 kg). CBC   Lab Results   Component Value Date    WBC 5.6 04/15/2019    RBC 2.63 04/15/2019    HGB 8.6 04/15/2019    HCT 26.4 04/15/2019    .4 04/15/2019    RDW 15.6 04/15/2019     04/15/2019     CMP    Lab Results   Component Value Date     04/16/2019    K 4.4 04/16/2019    K 4.3 04/11/2019    CL 98 04/16/2019    CO2 25 04/16/2019    BUN 40 04/16/2019    CREATININE 5.6 04/16/2019    GFRAA 12 04/16/2019    LABGLOM 10 04/16/2019    GLUCOSE 98 04/16/2019    CALCIUM 9.0 04/16/2019     BMP    Lab Results   Component Value Date     04/16/2019    K 4.4 04/16/2019    K 4.3 04/11/2019    CL 98 04/16/2019    CO2 25 04/16/2019    BUN 40 04/16/2019    CREATININE 5.6 04/16/2019    CALCIUM 9.0 04/16/2019    GFRAA 12 04/16/2019    LABGLOM 10 04/16/2019    GLUCOSE 98 04/16/2019     POCGlucose  No results for input(s): GLUCOSE in the last 72 hours.    Coags  No results found for: PROTIME, INR, APTT  HCG (If Applicable) No results found for: PREGTESTUR, PREGSERUM, HCG, HCGQUANT   ABGs No results found for: PHART, PO2ART, ZKD0OMM, DYN6IUY, BEART, G5PNGMNI   Type & Screen (If Applicable)  No results found for: LABABO, LABRH                         BMI: Wt Readings from Last 3 Encounters:       NPO Status:   Date of last liquid consumption: 12/31/20   Time of last liquid consumption: 0600   Date of last solid food consumption: 12/30/20      Time of last solid consumption: 0800       Anesthesia Evaluation  Patient summary reviewed no history of anesthetic complications:   Airway: Mallampati: III  TM distance: >3 FB   Neck ROM: full   Dental:    (+) partials      Pulmonary:normal exam    (+) sleep apnea:  asthma:                            Cardiovascular:  Exercise tolerance: good (>4 METS), (+) hypertension:,         Rhythm: regular  Rate: normal           Beta Blocker:  Not on Beta Blocker         Neuro/Psych:   (+) neuromuscular disease:,             GI/Hepatic/Renal:   (+) renal disease: CRI, bowel prep,           Endo/Other: Negative Endo/Other ROS                    Abdominal:           Vascular: negative vascular ROS. Anesthesia Plan      MAC     ASA 3       Induction: intravenous. Anesthetic plan and risks discussed with patient. Plan discussed with CRNA. This pre-anesthesia assessment may be used as a history and physical.    DOS STAFF ADDENDUM:    Pt seen and examined, chart reviewed (including anesthesia, drug and allergy history). No interval changes to history and physical examination. Anesthetic plan, risks, benefits, alternatives, and personnel involved discussed with patient. Questions and concerns addressed. Patient(family) verbalized an understanding and agrees to proceed.       Dianah Moritz, MD  December 31, 2020  9:50 AM

## 2020-12-31 NOTE — PROCEDURES
diverticulosis and internal hemorrhoids. Recommendations:  Await pathology. Repeat colonoscopy in 5 years.     Kaila Lima MD   [facility]  12/31/2020

## 2020-12-31 NOTE — ANESTHESIA POSTPROCEDURE EVALUATION
[facility] Department of Anesthesiology  Post-Anesthesia Note       Name:  Jose Guadalupe Cambyohannes                                  Age:  70 y.o. MRN:  [id number]     Last Vitals & Oxygen Saturation: BP (!) 146/89   Pulse 95   Temp 97.8 °F (36.6 °C) (Tympanic)   Resp (!) 34   Ht 5' 9\" (1.753 m)   Wt 189 lb 13.1 oz (86.1 kg)   SpO2 98%   BMI 28.03 kg/m²   Patient Vitals for the past 4 hrs:   BP Temp Temp src Pulse Resp SpO2 Height Weight   12/31/20 1115 (!) 146/89   95 (!) 34 98 %     12/31/20 1100 124/80   86 20 97 %     12/31/20 1055 124/80 97.8 °F (36.6 °C) Tympanic 87 13 95 %     12/31/20 0940 (!) 159/87 97.1 °F (36.2 °C) Temporal 96 18      12/31/20 0931       5' 9\" (1.753 m) 189 lb 13.1 oz (86.1 kg)       Level of consciousness:  Awake, alert    Respiratory: Respirations easy, no distress. Stable. Cardiovascular: Hemodynamically stable. Hydration: Adequate. PONV: Adequately managed. Post-op pain: Adequately controlled. Post-op assessment: Tolerated anesthetic well without complication. Complications:  None.     Eduardo Vital MD  December 31, 2020   11:40 AM

## 2020-12-31 NOTE — PROGRESS NOTES
Patient in PACU awake and alert, spontaneously breathing on room air. Patient denies pain at this time.

## 2021-06-22 ENCOUNTER — HOSPITAL ENCOUNTER (EMERGENCY)
Age: 76
Discharge: ANOTHER HOSPITAL | End: 2021-06-22
Attending: EMERGENCY MEDICINE
Payer: MEDICARE

## 2021-06-22 ENCOUNTER — APPOINTMENT (OUTPATIENT)
Dept: CT IMAGING | Age: 76
End: 2021-06-22
Payer: MEDICARE

## 2021-06-22 ENCOUNTER — APPOINTMENT (OUTPATIENT)
Dept: GENERAL RADIOLOGY | Age: 76
End: 2021-06-22
Payer: MEDICARE

## 2021-06-22 VITALS
BODY MASS INDEX: 28.24 KG/M2 | RESPIRATION RATE: 23 BRPM | OXYGEN SATURATION: 97 % | SYSTOLIC BLOOD PRESSURE: 100 MMHG | DIASTOLIC BLOOD PRESSURE: 66 MMHG | HEIGHT: 69 IN | WEIGHT: 190.7 LBS | HEART RATE: 104 BPM | TEMPERATURE: 98.5 F

## 2021-06-22 DIAGNOSIS — M00.9 PYOGENIC ARTHRITIS OF LEFT SHOULDER REGION, DUE TO UNSPECIFIED ORGANISM (HCC): ICD-10-CM

## 2021-06-22 DIAGNOSIS — N39.0 URINARY TRACT INFECTION WITHOUT HEMATURIA, SITE UNSPECIFIED: ICD-10-CM

## 2021-06-22 DIAGNOSIS — N17.9 AKI (ACUTE KIDNEY INJURY) (HCC): ICD-10-CM

## 2021-06-22 DIAGNOSIS — F10.10 ALCOHOL ABUSE: ICD-10-CM

## 2021-06-22 DIAGNOSIS — R16.0 HEPATOMEGALY: ICD-10-CM

## 2021-06-22 DIAGNOSIS — A41.9 SEPTICEMIA (HCC): Primary | ICD-10-CM

## 2021-06-22 DIAGNOSIS — J96.01 ACUTE RESPIRATORY FAILURE WITH HYPOXIA (HCC): ICD-10-CM

## 2021-06-22 LAB
A/G RATIO: 0.8 (ref 1.1–2.2)
ABO/RH: NORMAL
ALBUMIN SERPL-MCNC: 2.7 G/DL (ref 3.4–5)
ALP BLD-CCNC: 271 U/L (ref 40–129)
ALT SERPL-CCNC: 22 U/L (ref 10–40)
AMORPHOUS: ABNORMAL /HPF
ANION GAP SERPL CALCULATED.3IONS-SCNC: 26 MMOL/L (ref 3–16)
ANISOCYTOSIS: ABNORMAL
ANTIBODY SCREEN: NORMAL
AST SERPL-CCNC: 61 U/L (ref 15–37)
BACTERIA: ABNORMAL /HPF
BANDED NEUTROPHILS RELATIVE PERCENT: 4 % (ref 0–7)
BASOPHILS ABSOLUTE: 0 K/UL (ref 0–0.2)
BASOPHILS RELATIVE PERCENT: 0 %
BILIRUB SERPL-MCNC: 5.1 MG/DL (ref 0–1)
BILIRUBIN URINE: ABNORMAL
BLOOD, URINE: NEGATIVE
BUN BLDV-MCNC: 56 MG/DL (ref 7–20)
CALCIUM SERPL-MCNC: 8.1 MG/DL (ref 8.3–10.6)
CHLORIDE BLD-SCNC: 89 MMOL/L (ref 99–110)
CLARITY: CLEAR
CO2: 15 MMOL/L (ref 21–32)
COLOR: ABNORMAL
CREAT SERPL-MCNC: 3.6 MG/DL (ref 0.8–1.3)
EKG ATRIAL RATE: 104 BPM
EKG DIAGNOSIS: NORMAL
EKG P AXIS: 51 DEGREES
EKG P-R INTERVAL: 182 MS
EKG Q-T INTERVAL: 338 MS
EKG QRS DURATION: 112 MS
EKG QTC CALCULATION (BAZETT): 444 MS
EKG R AXIS: -31 DEGREES
EKG T AXIS: 22 DEGREES
EKG VENTRICULAR RATE: 104 BPM
EOSINOPHILS ABSOLUTE: 0 K/UL (ref 0–0.6)
EOSINOPHILS RELATIVE PERCENT: 0 %
EPITHELIAL CELLS, UA: ABNORMAL /HPF (ref 0–5)
GFR AFRICAN AMERICAN: 20
GFR NON-AFRICAN AMERICAN: 17
GLOBULIN: 3.5 G/DL
GLUCOSE BLD-MCNC: 66 MG/DL (ref 70–99)
GLUCOSE URINE: NEGATIVE MG/DL
HCT VFR BLD CALC: 35.1 % (ref 40.5–52.5)
HEMOGLOBIN: 10.5 G/DL (ref 13.5–17.5)
HYALINE CASTS: ABNORMAL /LPF (ref 0–2)
KETONES, URINE: ABNORMAL MG/DL
LACTIC ACID, SEPSIS: 10 MMOL/L (ref 0.4–1.9)
LACTIC ACID: 7.1 MMOL/L (ref 0.4–2)
LEUKOCYTE ESTERASE, URINE: ABNORMAL
LYMPHOCYTES ABSOLUTE: 0.2 K/UL (ref 1–5.1)
LYMPHOCYTES RELATIVE PERCENT: 2 %
MACROCYTES: ABNORMAL
MCH RBC QN AUTO: 30.1 PG (ref 26–34)
MCHC RBC AUTO-ENTMCNC: 29.9 G/DL (ref 31–36)
MCV RBC AUTO: 100.7 FL (ref 80–100)
MICROSCOPIC EXAMINATION: YES
MONOCYTES ABSOLUTE: 0.3 K/UL (ref 0–1.3)
MONOCYTES RELATIVE PERCENT: 3 %
NEUTROPHILS ABSOLUTE: 10.4 K/UL (ref 1.7–7.7)
NEUTROPHILS RELATIVE PERCENT: 91 %
NITRITE, URINE: POSITIVE
NUCLEATED RED BLOOD CELLS: 1 /100 WBC
PDW BLD-RTO: 18.3 % (ref 12.4–15.4)
PH UA: 5 (ref 5–8)
PLATELET # BLD: 89 K/UL (ref 135–450)
PLATELET SLIDE REVIEW: ABNORMAL
PMV BLD AUTO: 10.7 FL (ref 5–10.5)
POLYCHROMASIA: ABNORMAL
POTASSIUM SERPL-SCNC: 4.6 MMOL/L (ref 3.5–5.1)
PRO-BNP: 1932 PG/ML (ref 0–124)
PROCALCITONIN: 6.62 NG/ML (ref 0–0.15)
PROTEIN UA: NEGATIVE MG/DL
RBC # BLD: 3.49 M/UL (ref 4.2–5.9)
RBC UA: ABNORMAL /HPF (ref 0–4)
RENAL EPITHELIAL, UA: ABNORMAL /HPF (ref 0–1)
REPORT: NORMAL
SARS-COV-2, NAAT: NOT DETECTED
SLIDE REVIEW: ABNORMAL
SODIUM BLD-SCNC: 130 MMOL/L (ref 136–145)
SPECIFIC GRAVITY UA: >=1.03 (ref 1–1.03)
TOTAL CK: 50 U/L (ref 39–308)
TOTAL PROTEIN: 6.2 G/DL (ref 6.4–8.2)
TROPONIN: 0.04 NG/ML
URINE REFLEX TO CULTURE: ABNORMAL
URINE TYPE: ABNORMAL
UROBILINOGEN, URINE: 1 E.U./DL
VACUOLATED NEUTROPHILS: PRESENT
WBC # BLD: 10.9 K/UL (ref 4–11)
WBC UA: ABNORMAL /HPF (ref 0–5)

## 2021-06-22 PROCEDURE — 6370000000 HC RX 637 (ALT 250 FOR IP): Performed by: NURSE PRACTITIONER

## 2021-06-22 PROCEDURE — 87040 BLOOD CULTURE FOR BACTERIA: CPT

## 2021-06-22 PROCEDURE — 2580000003 HC RX 258: Performed by: NURSE PRACTITIONER

## 2021-06-22 PROCEDURE — 93010 ELECTROCARDIOGRAM REPORT: CPT | Performed by: INTERNAL MEDICINE

## 2021-06-22 PROCEDURE — 94761 N-INVAS EAR/PLS OXIMETRY MLT: CPT

## 2021-06-22 PROCEDURE — 81001 URINALYSIS AUTO W/SCOPE: CPT

## 2021-06-22 PROCEDURE — 83605 ASSAY OF LACTIC ACID: CPT

## 2021-06-22 PROCEDURE — 84145 PROCALCITONIN (PCT): CPT

## 2021-06-22 PROCEDURE — 71045 X-RAY EXAM CHEST 1 VIEW: CPT

## 2021-06-22 PROCEDURE — 86901 BLOOD TYPING SEROLOGIC RH(D): CPT

## 2021-06-22 PROCEDURE — 87635 SARS-COV-2 COVID-19 AMP PRB: CPT

## 2021-06-22 PROCEDURE — 96365 THER/PROPH/DIAG IV INF INIT: CPT

## 2021-06-22 PROCEDURE — 71250 CT THORAX DX C-: CPT

## 2021-06-22 PROCEDURE — 87186 SC STD MICRODIL/AGAR DIL: CPT

## 2021-06-22 PROCEDURE — 94640 AIRWAY INHALATION TREATMENT: CPT

## 2021-06-22 PROCEDURE — 6360000002 HC RX W HCPCS: Performed by: NURSE PRACTITIONER

## 2021-06-22 PROCEDURE — 93005 ELECTROCARDIOGRAM TRACING: CPT | Performed by: NURSE PRACTITIONER

## 2021-06-22 PROCEDURE — 73200 CT UPPER EXTREMITY W/O DYE: CPT

## 2021-06-22 PROCEDURE — 86850 RBC ANTIBODY SCREEN: CPT

## 2021-06-22 PROCEDURE — 83880 ASSAY OF NATRIURETIC PEPTIDE: CPT

## 2021-06-22 PROCEDURE — 85025 COMPLETE CBC W/AUTO DIFF WBC: CPT

## 2021-06-22 PROCEDURE — 87150 DNA/RNA AMPLIFIED PROBE: CPT

## 2021-06-22 PROCEDURE — 84484 ASSAY OF TROPONIN QUANT: CPT

## 2021-06-22 PROCEDURE — 80053 COMPREHEN METABOLIC PANEL: CPT

## 2021-06-22 PROCEDURE — 2700000000 HC OXYGEN THERAPY PER DAY

## 2021-06-22 PROCEDURE — 99285 EMERGENCY DEPT VISIT HI MDM: CPT

## 2021-06-22 PROCEDURE — 86900 BLOOD TYPING SEROLOGIC ABO: CPT

## 2021-06-22 PROCEDURE — 96367 TX/PROPH/DG ADDL SEQ IV INF: CPT

## 2021-06-22 PROCEDURE — 96366 THER/PROPH/DIAG IV INF ADDON: CPT

## 2021-06-22 PROCEDURE — 82550 ASSAY OF CK (CPK): CPT

## 2021-06-22 PROCEDURE — 36415 COLL VENOUS BLD VENIPUNCTURE: CPT

## 2021-06-22 RX ORDER — POTASSIUM CHLORIDE 20 MEQ/1
20 TABLET, EXTENDED RELEASE ORAL DAILY
COMMUNITY

## 2021-06-22 RX ORDER — TORSEMIDE 100 MG/1
50 TABLET ORAL DAILY
COMMUNITY

## 2021-06-22 RX ORDER — GAUZE BANDAGE 2" X 2"
100 BANDAGE TOPICAL DAILY
Status: DISCONTINUED | OUTPATIENT
Start: 2021-06-22 | End: 2021-06-22 | Stop reason: HOSPADM

## 2021-06-22 RX ORDER — LORAZEPAM 1 MG/1
2 TABLET ORAL
Status: DISCONTINUED | OUTPATIENT
Start: 2021-06-22 | End: 2021-06-22 | Stop reason: HOSPADM

## 2021-06-22 RX ORDER — IPRATROPIUM BROMIDE AND ALBUTEROL SULFATE 2.5; .5 MG/3ML; MG/3ML
1 SOLUTION RESPIRATORY (INHALATION) ONCE
Status: COMPLETED | OUTPATIENT
Start: 2021-06-22 | End: 2021-06-22

## 2021-06-22 RX ORDER — LORAZEPAM 2 MG/ML
1 INJECTION INTRAMUSCULAR
Status: DISCONTINUED | OUTPATIENT
Start: 2021-06-22 | End: 2021-06-22 | Stop reason: HOSPADM

## 2021-06-22 RX ORDER — MULTIVITAMIN WITH IRON
1 TABLET ORAL DAILY
Status: DISCONTINUED | OUTPATIENT
Start: 2021-06-22 | End: 2021-06-22 | Stop reason: HOSPADM

## 2021-06-22 RX ORDER — LORAZEPAM 2 MG/ML
2 INJECTION INTRAMUSCULAR
Status: DISCONTINUED | OUTPATIENT
Start: 2021-06-22 | End: 2021-06-22 | Stop reason: HOSPADM

## 2021-06-22 RX ORDER — LORAZEPAM 1 MG/1
1 TABLET ORAL
Status: DISCONTINUED | OUTPATIENT
Start: 2021-06-22 | End: 2021-06-22 | Stop reason: HOSPADM

## 2021-06-22 RX ORDER — LORAZEPAM 1 MG/1
4 TABLET ORAL
Status: DISCONTINUED | OUTPATIENT
Start: 2021-06-22 | End: 2021-06-22 | Stop reason: HOSPADM

## 2021-06-22 RX ORDER — LORAZEPAM 2 MG/ML
3 INJECTION INTRAMUSCULAR
Status: DISCONTINUED | OUTPATIENT
Start: 2021-06-22 | End: 2021-06-22 | Stop reason: HOSPADM

## 2021-06-22 RX ORDER — LORAZEPAM 1 MG/1
3 TABLET ORAL
Status: DISCONTINUED | OUTPATIENT
Start: 2021-06-22 | End: 2021-06-22 | Stop reason: HOSPADM

## 2021-06-22 RX ORDER — ALLOPURINOL 300 MG/1
300 TABLET ORAL DAILY
COMMUNITY
End: 2021-06-22

## 2021-06-22 RX ORDER — PREDNISONE 20 MG/1
60 TABLET ORAL ONCE
Status: COMPLETED | OUTPATIENT
Start: 2021-06-22 | End: 2021-06-22

## 2021-06-22 RX ORDER — LORAZEPAM 2 MG/ML
4 INJECTION INTRAMUSCULAR
Status: DISCONTINUED | OUTPATIENT
Start: 2021-06-22 | End: 2021-06-22 | Stop reason: HOSPADM

## 2021-06-22 RX ORDER — 0.9 % SODIUM CHLORIDE 0.9 %
30 INTRAVENOUS SOLUTION INTRAVENOUS ONCE
Status: COMPLETED | OUTPATIENT
Start: 2021-06-22 | End: 2021-06-22

## 2021-06-22 RX ORDER — FOLIC ACID 1 MG/1
1 TABLET ORAL DAILY
Status: DISCONTINUED | OUTPATIENT
Start: 2021-06-22 | End: 2021-06-22 | Stop reason: HOSPADM

## 2021-06-22 RX ADMIN — FOLIC ACID 1 MG: 1 TABLET ORAL at 14:49

## 2021-06-22 RX ADMIN — IPRATROPIUM BROMIDE AND ALBUTEROL SULFATE 1 AMPULE: .5; 3 SOLUTION RESPIRATORY (INHALATION) at 12:31

## 2021-06-22 RX ADMIN — SODIUM CHLORIDE 2595 ML: 9 INJECTION, SOLUTION INTRAVENOUS at 12:24

## 2021-06-22 RX ADMIN — CEFTRIAXONE 1000 MG: 1 INJECTION, POWDER, FOR SOLUTION INTRAMUSCULAR; INTRAVENOUS at 12:24

## 2021-06-22 RX ADMIN — THIAMINE HCL TAB 100 MG 100 MG: 100 TAB at 14:49

## 2021-06-22 RX ADMIN — THERA TABS 1 TABLET: TAB at 14:49

## 2021-06-22 RX ADMIN — PREDNISONE 60 MG: 20 TABLET ORAL at 11:12

## 2021-06-22 RX ADMIN — AZITHROMYCIN MONOHYDRATE 500 MG: 500 INJECTION, POWDER, LYOPHILIZED, FOR SOLUTION INTRAVENOUS at 13:07

## 2021-06-22 RX ADMIN — VANCOMYCIN HYDROCHLORIDE 1250 MG: 5 INJECTION, POWDER, LYOPHILIZED, FOR SOLUTION INTRAVENOUS at 14:51

## 2021-06-22 ASSESSMENT — PAIN SCALES - GENERAL
PAINLEVEL_OUTOF10: 0

## 2021-06-22 NOTE — ED NOTES
Pt to be transferred to [address] [place] [address]. [facility] to transport pt, eta 1730. Pt made aware and agrees with transfer.       Nathalia Bruno RN  06/22/21 1100

## 2021-06-22 NOTE — ED NOTES
Pt to ED via [facility] with c/o sob, bright red blood in his stool, dizziness and generalized weakness. States symptoms have been ongoing for 2 weeks, progressively worsening. Pt states he had to \"lower himself to the ground\" this morning d/t increased weakness and was unable to get up by himself, EMS had to assist pt. Pt tachycardic and tachypneic, b/p 93/54 with o2 sats 85% on ra upon ED arrival.  4L o2/nc placed with sats increasing to 93%.        Elie Dill, RN  06/22/21 [id number]

## 2021-06-22 NOTE — ED NOTES
Attempted to updated pt's spouse Abilio Mercer regarding pt transfer to [facility] hospital without success. Phone continued to ring without option to leave a voicemail.       Jaison Irby RN  06/22/21 [id number]

## 2021-06-22 NOTE — ED PROVIDER NOTES
[address]  [address] [address]  Dept: [phone]  Loc: [address] [place] [address] COMPLAINT    Chief Complaint   Patient presents with    Shortness of Breath     Pt to ED via [facility] with c/o sob, bright red blood in his stool, dizziness and generalized weakness. Ongoing for 2 weeks, continues to worsen. Pt states he had to \"lower himself to the ground\" this morning d/t increased weakness and was unable to get up by himself.  Rectal Bleeding       HPI    Lupe Phillips is a 70 y.o. male who presents to the emergency department via EMS with complaints of falling on the floor today. He states that he been using a walker over the last couple of weeks and he had just gotten out of chair and was ambulating across the room when he felt extremely weak and his legs gave out. He landed on his buttocks. He denies striking his head or having loss of consciousness. He denied for me feelings of lightheadedness or dizziness. His wife tried to get him up off the floor for about an hour and she could not so she called EMS. He denies any back pain, neck pain, extremity pain. He is complaining of shortness of breath. He states that he has a history of asthma. He was noted to be 85% on room air. He does not wear oxygen normally. He denies chest pain, headache, dysuria, urgency, frequency, hematuria, back pain, abdominal pain. He is reporting chronic bright red blood with bowel movements. He reports that his physicians are aware of this. His GI physician is Dr. Kameron Chau and he states he has had a colonoscopy last a couple of months ago and he was told to stop taking his aspirin. He has a history of hypertension. He has not taken any of his medications today. He has a history of a left rotator cuff repair and shoulder replacement that got infected.  He developed acute kidney failure during this time. His wife called and stated that he has been having some pain in the left shoulder. They saw his orthopedic physician on Friday who will follow up with him in a couple of weeks. The wife is stating that the last time he developed pain in the shoulder he became septic. REVIEW OF SYSTEMS    Cardiac: no chest pain, no palpitations, no syncope  Respiratory: see HPI, NO cough  Neurologic: no syncope or LOC  GI: no vomiting, no diarrhea  General: denies fever  All other systems reviewed and are negative. PAST MEDICAL & SURGICAL HISTORY    Past Medical History:   Diagnosis Date    Arthritis     Asthma     Hypertension     Kidney dysfunction 2019    after long term antibiotic and shoulder replacement infection removal    Neuropathy of both feet     Neuropathy of hand, left     Neuropathy of hand, right     Sleep apnea     USESE CPAP     Past Surgical History:   Procedure Laterality Date    APPENDECTOMY      COLONOSCOPY  02/20/2017    Dr Dejuan Larkin, polyp     COLONOSCOPY N/A 12/31/2020    COLONOSCOPY WITH BIOPSY performed by Marge Chapin MD at [address] Left     shoulder    OTHER SURGICAL HISTORY      left shoulder replacement removal    SHOULDER SURGERY         CURRENT MEDICATIONS  (may include discharge medications prescribed in the ED)  Current Outpatient Rx   Medication Sig Dispense Refill    potassium chloride (KLOR-CON M) 20 MEQ extended release tablet Take 20 mEq by mouth daily      torsemide (DEMADEX) 100 MG tablet Take 50 mg by mouth daily       gabapentin (NEURONTIN) 300 MG capsule Take 300 mg by mouth every evening.       ferrous sulfate (FE TABS 325) 325 (65 Fe) MG EC tablet Take 325 mg by mouth daily (with breakfast)       verapamil (CALAN SR) 240 MG extended release tablet Take 1 tablet by mouth daily 30 tablet 3    vitamin B-1 (THIAMINE) 100 MG tablet Take 100 mg by mouth daily      acetaminophen (TYLENOL) 500 MG Sexually Abused:      Family History   Problem Relation Age of Onset    COPD Mother     Heart Disease Father        PHYSICAL EXAM    VITAL SIGNS: BP (!) 105/56   Pulse 110   Temp 98.1 °F (36.7 °C) (Oral)   Resp 26   Ht 5' 9\" (1.753 m)   Wt 190 lb 11.2 oz (86.5 kg)   SpO2 97%   BMI 28.16 kg/m²    Constitutional:  Well developed, well nourished, mild respiratory distress  HENT:  Atraumatic, dry mucus membranes, pale conjunctiva, sclera nonicteric  Neck: supple, no JVD   Respiratory: Respiratory rate 34 breaths/min on arrival.  Using abdominal accessory's to aid in breathing. He is currently on O2 at 4 L per nasal cannula and satting at 94% on this. He is speaking in full uninterrupted sentences. He has expiratory wheezes throughout on the left anteriorly and posteriorly. Cardiovascular: Tachycardic rate with a regular rhythm. S1-S2. No murmur  Vascular: Radial and DP pulses 2+ and equal bilaterally, 2-3+ pitting edema lower legs  GI: Abdomen is soft, round and distended. He has an enlarged liver that is firm with palpation and extends approximately 5 cm below the rib. The left lobe of the liver can also be palpated over the epigastrium. There is no pain, rebound or guarding. Normoactive bowel sounds throughout. Musculoskeletal:  no lower extremity asymmetry, no calf tenderness, no thigh tenderness, no acute deformities. No obvious deformity to the shoulder on the left. Old healed surgical incision noted anteriorly. There is erythema and warmth between the left side of the neck to the [facility] joint. There is some soft tissue swelling to the supra clavicular area. No crepitance. Mild tenderness with palpation over the anterior shoulder. Patient has full range of motion. The erythema and swelling do not extend over the shoulder.   Integument:  Skin is warm and dry, no petechiae   Neurologic:  Alert & oriented x4, no slurred speech  Psych: Pleasant affect, no hallucinations    EKG      Twelve-lead EKG reviewed by myself and interpreted by my attending physician Dr. Mis Fernando. Ventricular rate 104 bpm, parable 182 ms, QRS duration 112 ms,  ms  No ST elevation or depression. Interpretation is a sinus tachycardia with an incomplete right bundle branch block. No old EKGs found for comparison. RADIOLOGY/PROCEDURES    CT CHEST WO CONTRAST   Final Result   No acute cardiopulmonary process. Atherosclerotic changes seen in the aorta. CT SHOULDER LEFT WO CONTRAST   Final Result   Extensive edema within the trapezius muscle, with loss of the intramuscular   fat planes, and with a bubble of gas, concerning for myositis. An abscess is   not seen at this time. There is extension of edema into the anterior chest wall, as well as adjacent   to the anterior aspect the humerus. Though that could be reactive,   additional cellulitis remains in the differential.      There is evidence of remote left shoulder prosthesis which has been removed,   with cement spacer placement. There is expected bone stock loss at the   glenoid. No convincing CT evidence of osteomyelitis is identified at this   time. XR CHEST PORTABLE   Final Result   No acute process.            Labs Reviewed   CBC WITH AUTO DIFFERENTIAL - Abnormal; Notable for the following components:       Result Value    RBC 3.49 (*)     Hemoglobin 10.5 (*)     Hematocrit 35.1 (*)     .7 (*)     MCHC 29.9 (*)     RDW 18.3 (*)     Platelets 89 (*)     MPV 10.7 (*)     Neutrophils Absolute 10.4 (*)     Lymphocytes Absolute 0.2 (*)     nRBC 1 (*)     Vacuolated Neutrophils Present (*)     Anisocytosis 1+ (*)     Macrocytes 1+ (*)     Polychromasia Occasional (*)     All other components within normal limits    Narrative:     Performed at:  [address] [address]   Phone [phone]   COMPREHENSIVE METABOLIC PANEL - Abnormal; Notable for the following components: Sodium 130 (*)     Chloride 89 (*)     CO2 15 (*)     Anion Gap 26 (*)     Glucose 66 (*)     BUN 56 (*)     CREATININE 3.6 (*)     GFR Non- 17 (*)     GFR African American 20 (*)     Calcium 8.1 (*)     Total Protein 6.2 (*)     Albumin 2.7 (*)     Albumin/Globulin Ratio 0.8 (*)     Total Bilirubin 5.1 (*)     Alkaline Phosphatase 271 (*)     AST 61 (*)     All other components within normal limits    Narrative:     Performed at:  [place] [address]   Phone [phone]   TROPONIN - Abnormal; Notable for the following components:    Troponin 0.04 (*)     All other components within normal limits    Narrative:     Performed at:  [address] [place] [address]   Phone [phone]   LACTATE, SEPSIS - Abnormal; Notable for the following components:    Lactic Acid, Sepsis 10.0 (*)     All other components within normal limits    Narrative:     Karo Summers tel. [phone],  Chemistry results called to and read back by Connie Rand RN, 06/22/2021 11:35,  by [facility]  Performed at:  [place] [address]   Phone [phone]   URINE RT REFLEX TO CULTURE - Abnormal; Notable for the following components:    Color, UA DARK YELLOW (*)     Bilirubin Urine SMALL (*)     Ketones, Urine TRACE (*)     Nitrite, Urine POSITIVE (*)     Leukocyte Esterase, Urine TRACE (*)     All other components within normal limits    Narrative:     Performed at:  [place] [address]   Phone [phone]   BRAIN NATRIURETIC PEPTIDE - Abnormal; Notable for the following components:    Pro-BNP 1,932 (*)     All other components within normal limits    Narrative:     Performed at:  [address] [place] [address] Phone [phone]   MICROSCOPIC URINALYSIS - Abnormal; Notable for the following components:    Hyaline Casts, UA 6-10 (*)     Bacteria, UA 1+ (*)     All other components within normal limits    Narrative:     Performed at:  [facility]  [address] [place] [address] [place] [address]   Phone [phone]   PROCALCITONIN - Abnormal; Notable for the following components:    Procalcitonin 6.62 (*)     All other components within normal limits    Narrative:     Performed at:  [address] [place] [address]   Phone [phone] [phone]    Narrative:     Performed at:  [facility]  [address] [place] [address] [place] [address]   Phone [phone]   CULTURE, BLOOD 1   CULTURE, BLOOD 2   CK    Narrative:     Performed at:  [address] [place] [employer] [address]   Phone [phone]   LACTIC ACID, PLASMA   TYPE AND SCREEN    Narrative:     Performed at:  [address] [place] [employer] [address]   Phone [phone]       ED COURSE & MEDICAL DECISION MAKING    Pertinent Labs & Imaging studies reviewed and interpreted. (See chart for details)    See chart for details of medications given during the ED stay.     Vitals:    06/22/21 1216 06/22/21 1232 06/22/21 1233 06/22/21 1302   BP: 86/71 94/60  (!) 105/56   Pulse: 101 100  110   Resp: (!) 31 23 27 26   Temp: 98.1 °F (36.7 °C)      TempSrc: Oral      SpO2: 97% 97% 95% 97%   Weight:       Height:         Medications   thiamine mononitrate tablet 100 mg (has no administration in time range)   multivitamin 1 tablet (has no administration in time range)   folic acid (FOLVITE) tablet 1 mg (has no administration in time range)   LORazepam (ATIVAN) tablet 1 mg (has no administration in time range)     Or   LORazepam (ATIVAN) injection 1 mg (has no administration in time range)     Or   LORazepam (ATIVAN) tablet 2 mg (has no administration in time range)     Or   LORazepam (ATIVAN) injection 2 mg (has no administration in time range)     Or   LORazepam (ATIVAN) tablet 3 mg (has no administration in time range)     Or   LORazepam (ATIVAN) injection 3 mg (has no administration in time range)     Or   LORazepam (ATIVAN) tablet 4 mg (has no administration in time range)     Or   LORazepam (ATIVAN) injection 4 mg (has no administration in time range)   vancomycin (VANCOCIN) 1250 mg in dextrose 5 % 250 mL IVPB (has no administration in time range)   ipratropium-albuterol (DUONEB) nebulizer solution 1 ampule (1 ampule Inhalation Given 6/22/21 1231)   predniSONE (DELTASONE) tablet 60 mg (60 mg Oral Given 6/22/21 1112)   0.9 % sodium chloride bolus (0 mL/kg × 86.5 kg Intravenous Stopped 6/22/21 1442)   cefTRIAXone (ROCEPHIN) 1000 mg IVPB in 50 mL D5W minibag (0 mg Intravenous Stopped 6/22/21 1307)   azithromycin (ZITHROMAX) 500 mg in D5W 250ml addavial (500 mg Intravenous New Bag 6/22/21 1307)     This patient was seen evaluated by myself my attending physician Dr. Darling Lam. Differential diagnosis includes but is not limited to ACS, congestive heart failure, COPD exacerbation, asthma exacerbation, PE, pneumonia, Covid, pneumothorax, infection from other source, other. This patient arrives to the emergency department in mild respiratory distress. He has never required oxygen before per his report. He was noted to be 85% on room air. He is currently on O2 at 2 L per nasal cannula. He is afebrile. He arrives also hypotensive with systolic pressures in the 80s. He has a history of hypertension and he has not taken any of his medications today. He is mildly tachycardic with a heart rate of 103-110. He fell today and was unable to get up. His wife called EMS and he was transported to the ER. He initially was worked up for a cardiac and pneumonia type work-up. Blood cultures x2 were drawn. He did not complain of left shoulder pain until after his wife called. The wife had called and stated that he had sepsis from a septic shoulder joint on the left before. He has been having some pain in his left shoulder again. He saw orthopedics on Friday and they are going to reevaluate him in a couple weeks. He has some erythema and soft tissue swelling to the left supraclavicular area and some mild tenderness with palpation over the left anterior shoulder however there is no obvious abnormality noted. Labs reveal sodium of 130 with a potassium of 4.6. He has a BUN of 56 with a creatinine of 3.6 and a GFR of 17. Anion gap is 26. He has a lactic acid initially of 10. He was given sepsis fluids per protocol. He denies a history of congestive heart failure but he does have 2-3+ pitting bilateral lower extremity edema. He did not have crackles on my initial evaluation but rather expiratory wheezes on the left side. He was given prednisone and a nebulizer treatment. CK is 50 and the proBNP is almost 2000. He has an elevated troponin of 0.04. He does not have chest pain so therefore the elevation is most likely secondary to his PETRA. He does not have CKD however he has had acute kidney failure/injury in the past during his sepsis episode with his left shoulder issue. This eventually resolved. After the nebulizer treatment I reevaluated him. He is now breathing at about 18/min and states he is feeling better. Expiratory wheezes have improved. He is still requiring oxygen and remains on O2 at 4 L per nasal cannula. No leukocytosis. Hemoglobin is 10.5 with hematocrit of 35.1. I added on a procalcitonin and this is pending at the time of my dictation. He has an enlarged liver. He is in a wine drinker of 3 to 4 glasses on a daily basis. Urinalysis reveals positive nitrites with trace leukocytes, 3-5 whites, 2-5 epithelials.   The Rocephin will cover the source    Repeat lactate level: pending    On reassessment after fluid resuscitation:   Vitals update: 1440 VS 98.1-110-105/56- 26- 97% on 4L/NC. cardiopulmonary exam: RRR, capillary refill: brisk, peripheral pulses: intact 2+ bilaterally, skin examination: warm and dry    1403 I spoke to Dr. Enrike High (ortho) regarding this patient. He will accept the transfer since this is his patient. 1440 procalcitonin is 6.62. Lactic acid is pending. [address] [place] spoke with Aries Decker regarding this patient. She will accept the patient for transfer. FINAL IMPRESSION    1. Septicemia ([facility]    2. Urinary tract infection without hematuria, site unspecified    3. Pyogenic arthritis of left shoulder region, due to unspecified organism ([facility]    4. Acute respiratory failure with hypoxia (HCC)    5. PETRA (acute kidney injury) ([facility]    6. Hepatomegaly    7.  Alcohol abuse        PLAN  Transfer to [facility]    (Please note that this note was completed with a voice recognition program.  Every attempt was made to edit the dictations, but inevitably there remain words that are mis-transcribed.)        JONI Robles - CNP  06/22/21 215 JONI Hernandez - JUSTINA  06/22/21 6595

## 2021-06-22 NOTE — ED NOTES
Report called to receiving MIKE Green at [facility], all questions answered.       Joshua Rooney RN  06/22/21 7865

## 2021-06-22 NOTE — ED NOTES
Bed: [facility]  Expected date:   Expected time:   Means of arrival: [facility]  Comments:  75M weak, dizzy     Talia Clay RN  06/22/21 9734

## 2021-06-22 NOTE — ED PROVIDER NOTES
Attending Supervisory Note/Shared Visit   I have personally performed a face to face diagnostic evaluation on this patient. I have reviewed the mid-levels findings and agree. History and Exam by me shows an alert white male no acute distress. He presents today with generalized weakness and dizziness. Been ongoing for 2 weeks, seems to be getting worse. He has had an ongoing history of some rectal bleeding. Last colonoscopy was in December. At that time he had polyps and some internal hemorrhoids. He states today he was so weak he could not get up and dress himself. He does admit to daily alcohol use. Heart: Regular rate and rhythm. No murmurs or gallops noted. Lungs: Breath sounds equal bilaterally and clear. Abdomen: Obese, soft, nontender. Liver is 5 cm below the right costal margin and not significantly tender. Musculoskeletal: 1+ pitting pretibial edema bilateral lower extremities. Skin: Warm and dry, good turgor. No pallor or cyanosis. No diaphoresis. EKG: Sinus tachycardia, rate of 104, left axis deviation, low voltage QRS, incomplete right bundle branch block. Rhythm strip shows sinus tachycardia with a rate of 104, WA interval 182 ms, QRS monitor 12 ms with PACs, no other ectopy as interpreted by me. No old EKG available for comparison. Lab reviewed. Urinalysis shows 6-10 hyaline casts, 3-5 white cells, nitrite positive, 1+ bacteria. H&H of 10.5 and 35.1. White blood cell count 10,900. Sodium 130 with potassium 4.6. BUN of 56 with a creatinine of 3.6. Jarocho of 5.1 with an AST of 61 and ALT of 22. Bicarb of 15 with an anion gap of 26. Troponin of 0.04. Lactic acid of 10.0. BNP of 1932. CK of 50. Covid negative. CK chest: No acute abnormality. CT left shoulder: Extensive edema within the trapezius muscle with loss of the intramuscular fat planes and with a bubble gas concerning for myositis. An abscess is not seen at this time.   There is extension of the edema into the anterior chest wall as well as adjacent to the anterior aspect of the humerus. Though that could be reactive additional cellulitis remains in the differential.    This patient was hypoxic and hypotensive on arrival.  Placed on supplemental oxygen. Sepsis fluids were initiated. Because of his hypoxia he was suspected that he may have a pulmonary source of sepsis. IV antibiotics were initiated after blood cultures were obtained. Of note he is also had some shoulder pain recently and was seen by orthopedics yesterday. He had a previous history of a shoulder replacement which became septic and had to be removed. He had a previous history of acute renal failure. His renal function on review of his old records was normal in March. He does have some erythema on the superior anterior part of his left shoulder. He has some pain with range of motion. I cannot rule out this as a possible source of sepsis as well. A CT of his chest without contrast showed no evidence of pneumonia. He is in acute renal failure again. He will require admission along with the sepsis fluids, IV antibiotics and supplemental oxygen. Test results, diagnosis, and treatment plan were discussed with the patient. He understands the treatment plan and need for admission and is agreeable. The hospitalist were consulted for admission. After reviewing the case with the hospitalist and orthopedics they felt that he would be better served to be transferred to [facility] where he is received ongoing care for his ongoing problems with his left shoulder which is potentially the source of infection/sepsis. The case was discussed with his orthopedic physician. They have agreed to accept him for transfer to [facility].  This was discussed with the patient. He is agreeable with the transfer.     (Please note that portions of this note were completed with a voice recognition program.  Efforts were made to edit the dictations but occasionally words are mis-transcribed.)    Myriam Benavidez MD  Attending Emergency Physician        Leanne Christianson MD  06/22/21 Pieter Lujan MD  06/22/21 9670

## 2021-06-22 NOTE — PROGRESS NOTES
Medication Reconciliation    List of medications patient is currently taking is complete. Source of information: 1. Conversation with patient at bedside                                      2. EPIC records      Allergies  No known allergies     Notes regarding home medications:   1. Patient did not take any of his home medications prior to arrival to the emergency department today. 2. Patient indicates he takes Naproxen (Aleve) during the day for pain. Instructed patient to avoid NSAIDs given his renal function. Encouraged him to take Tylenol during the day instead if possible. 3. Patient does not take Allopurinol any longer.     Gabriel Goldstein RPH, PharmD, BCPS  6/22/2021 1:26 PM

## 2021-06-22 NOTE — ED NOTES
Pt transferred to [facility] at this time via [facility].       Hema Blanco RN  06/22/21 5759

## 2021-06-25 LAB
BLOOD CULTURE, ROUTINE: ABNORMAL
BLOOD CULTURE, ROUTINE: ABNORMAL
CULTURE, BLOOD 2: ABNORMAL
ORGANISM: ABNORMAL

## (undated) DEVICE — FORCEPS BX 240CM 2.4MM L NDL RAD JAW 4 M00513334

## (undated) DEVICE — ENDOSCOPY KIT: Brand: MEDLINE INDUSTRIES, INC.